# Patient Record
Sex: FEMALE | Race: BLACK OR AFRICAN AMERICAN | Employment: FULL TIME | URBAN - METROPOLITAN AREA
[De-identification: names, ages, dates, MRNs, and addresses within clinical notes are randomized per-mention and may not be internally consistent; named-entity substitution may affect disease eponyms.]

---

## 2018-06-13 LAB
AMB EXT CHLAMYDIA, NUCLEIC ACID AMP: NEGATIVE
AMB EXT GONOCOCCUS, NUCLEIC ACID AMP: NEGATIVE
AMB EXT HBSAG SCREEN: NEGATIVE
AMB EXT HIV AG AB COMBO: NON REACTIVE
AMB EXT TREPONEMAL ANTIBODIES: NON REACTIVE
AMB EXT URINE CULTURE ROUTINE: NO GROWTH

## 2018-06-18 LAB
AMB EXT ANTIBODY SCREEN: NEGATIVE
AMB EXT HEMATOCRIT: 35.3
AMB EXT HEMOGLOBIN: 11.4
AMB EXT MCV: 74.6
AMB EXT PLATELETS: 212
AMB EXT RH FACTOR: POSITIVE

## 2018-06-27 NOTE — Clinical Note
1. IUP @  21w2d  2. Scan consistent with dates 3. No fetal structural abnormalities seen 4. AMA 5. Obesity BMI 46  RECOMMENDATIONS: 1. Weekly NST at 34wks 2.   Monthly Growth US in 3rd trimester No

## 2018-07-11 ENCOUNTER — TELEPHONE (OUTPATIENT)
Dept: OBGYN CLINIC | Facility: CLINIC | Age: 33
End: 2018-07-11

## 2018-07-11 NOTE — TELEPHONE ENCOUNTER
13wks-- LMP: 4/9. Started OB care with [facility] but would like to start care with [facility] instead. Aware of rotating doctors, 2 male and 4 female doctors.

## 2018-07-25 ENCOUNTER — TELEPHONE (OUTPATIENT)
Dept: OBGYN CLINIC | Facility: CLINIC | Age: 33
End: 2018-07-25

## 2018-07-28 NOTE — TELEPHONE ENCOUNTER
Lmtcb to reschedule OBN
Please assist pt with rescheduling OBN appt
Pt no show for her OBN appt, transfer of care.  will call pt to reschedule her appt for OBN.
Clear

## 2018-08-13 ENCOUNTER — NURSE ONLY (OUTPATIENT)
Dept: OBGYN CLINIC | Facility: CLINIC | Age: 33
End: 2018-08-13
Payer: COMMERCIAL

## 2018-08-13 VITALS — WEIGHT: 293 LBS | HEIGHT: 67 IN | BODY MASS INDEX: 45.99 KG/M2

## 2018-08-13 DIAGNOSIS — Z34.82 ENCOUNTER FOR SUPERVISION OF OTHER NORMAL PREGNANCY IN SECOND TRIMESTER: Primary | ICD-10-CM

## 2018-08-13 RX ORDER — CHOLECALCIFEROL (VITAMIN D3) 25 MCG
1 TABLET,CHEWABLE ORAL DAILY
COMMUNITY
End: 2020-09-02

## 2018-08-13 NOTE — PROGRESS NOTES
Pt seen for OBN appt today as a transfer from [facility] with no complaints. Sickle cell ,hep c, and 1 hr gtt added on to pts PN labs. Pt advised all labs must be completed and resulted prior to MD appt. Pt accepted new ob appt with [facility] on 8/20.     Pt has ca fibroid noted on US with this pregnancy. Variocosities/DVTs No    Smoker No    Drug usage in prior year No    Alcohol Yes-socially prior to pregnancy     Would you accept a blood transfusion? If no, are you a Yazdanism?  Yes- pt stated she would

## 2018-08-20 ENCOUNTER — INITIAL PRENATAL (OUTPATIENT)
Dept: OBGYN CLINIC | Facility: CLINIC | Age: 33
End: 2018-08-20
Payer: COMMERCIAL

## 2018-08-20 ENCOUNTER — TELEPHONE (OUTPATIENT)
Dept: OBGYN CLINIC | Facility: CLINIC | Age: 33
End: 2018-08-20

## 2018-08-20 ENCOUNTER — APPOINTMENT (OUTPATIENT)
Dept: LAB | Facility: HOSPITAL | Age: 33
End: 2018-08-20
Attending: OBSTETRICS & GYNECOLOGY
Payer: COMMERCIAL

## 2018-08-20 VITALS
HEART RATE: 83 BPM | SYSTOLIC BLOOD PRESSURE: 134 MMHG | DIASTOLIC BLOOD PRESSURE: 77 MMHG | WEIGHT: 293 LBS | BODY MASS INDEX: 46 KG/M2

## 2018-08-20 DIAGNOSIS — Z34.92 ENCOUNTER FOR SUPERVISION OF NORMAL PREGNANCY IN SECOND TRIMESTER, UNSPECIFIED GRAVIDITY: Primary | ICD-10-CM

## 2018-08-20 DIAGNOSIS — O09.522 ELDERLY MULTIGRAVIDA IN SECOND TRIMESTER: ICD-10-CM

## 2018-08-20 DIAGNOSIS — Z34.82 ENCOUNTER FOR SUPERVISION OF OTHER NORMAL PREGNANCY IN SECOND TRIMESTER: ICD-10-CM

## 2018-08-20 PROBLEM — O09.529 ANTEPARTUM MULTIGRAVIDA OF ADVANCED MATERNAL AGE (HCC): Status: ACTIVE | Noted: 2018-08-20

## 2018-08-20 PROBLEM — O99.210 OBESITY AFFECTING PREGNANCY, ANTEPARTUM (HCC): Status: ACTIVE | Noted: 2018-08-20

## 2018-08-20 PROBLEM — O99.210 OBESITY AFFECTING PREGNANCY, ANTEPARTUM: Status: ACTIVE | Noted: 2018-08-20

## 2018-08-20 PROBLEM — O09.529 ANTEPARTUM MULTIGRAVIDA OF ADVANCED MATERNAL AGE: Status: ACTIVE | Noted: 2018-08-20

## 2018-08-20 LAB
APPEARANCE: CLEAR
GLUCOSE 1H P 50 G GLC PO SERPL-MCNC: 79 MG/DL
MULTISTIX LOT#: NORMAL NUMERIC
PH, URINE: 6 (ref 4.5–8)
SPECIFIC GRAVITY: 1.02 (ref 1–1.03)
URINE-COLOR: YELLOW
UROBILINOGEN,SEMI-QN: 0.2 MG/DL (ref 0–1.9)

## 2018-08-20 PROCEDURE — 82950 GLUCOSE TEST: CPT

## 2018-08-20 PROCEDURE — 86803 HEPATITIS C AB TEST: CPT

## 2018-08-20 PROCEDURE — 85660 RBC SICKLE CELL TEST: CPT

## 2018-08-20 PROCEDURE — 81002 URINALYSIS NONAUTO W/O SCOPE: CPT | Performed by: OBSTETRICS & GYNECOLOGY

## 2018-08-20 PROCEDURE — 36415 COLL VENOUS BLD VENIPUNCTURE: CPT

## 2018-08-20 NOTE — PROGRESS NOTES
No records for review -- only labs / u/s from care everywhere -- transferring from [facility] due to not happy w/ care there. Just did one hour glucola w/ hep C & Sickle cell today. Pt ot call tomorrow for results.  Reveiwed rationale for maternal echo & sleep st

## 2018-08-21 LAB — HCV AB SERPL QL IA: NONREACTIVE

## 2018-08-22 ENCOUNTER — TELEPHONE (OUTPATIENT)
Dept: OBGYN CLINIC | Facility: CLINIC | Age: 33
End: 2018-08-22

## 2018-08-22 NOTE — TELEPHONE ENCOUNTER
PT NOTIFIED ORDER FOR CONSULT AND LEVEL II PLACED AND PHONE NUMBER GIVEN TO SCHEDULE. PT IS AWARE LABS WERE NORMAL BUT STILL WAITING FOR HEMOGLOBIN SOLUBILITY WHICH IS IN PROCESS AND CAN CALL [facility] FOR THE RESULT.    PT AWARE SHE NEEDS TO CALL CENTRAL SCHEDU

## 2018-08-22 NOTE — TELEPHONE ENCOUNTER
Lmtcb. Please relay info:    Order for level 2 and mfm consult routed. Patient can call [phone] to schedule an appointment. Patient's insurance did not approve her maternal echo or sleep study.

## 2018-08-22 NOTE — TELEPHONE ENCOUNTER
ORDER PLACED FOR MATERNAL ECHO. LMTCB FOR SLEEP CENTER TO FIND OUT HOW TO CORRECTLY ORDER HOME SLEEP STUDY.

## 2018-08-22 NOTE — TELEPHONE ENCOUNTER
Per [facility] clinical reviewer patient does not meet criteria for cpt 40077/ maternal echo. For Peer to Peer review MD can call [phone].  No Ref#  Clinical reviewer Vishnu Dukes. noted pt does not meet criteria for cpt 61 [phone] 57, but, does qualify for home sleep

## 2018-08-23 NOTE — TELEPHONE ENCOUNTER
RECEIVED DENIAL FOR SLEEP STUDY IN THE SLEEP CENTER. CALLED SLEEP CENTER AGAIN.  WAS TOLD THE OUTPATIENT HOME SLEEP STUDY IS CPT: 22174.

## 2018-08-27 NOTE — TELEPHONE ENCOUNTER
MATERNAL ECHO ORDERED BELOW WAS INCORRECT AGAIN, ORDER CANCELLED AND EKG ORDERED. CALLED [facility], SPOKE WITH ANIBAL. CLINICAL INFO GIVEN FOR HOME SLEEP STUDY, CPT: [id number]. WAS TOLD THIS IS GOING TO MD REVIEW AND A PEER TO PEER IS NEEDED.   NEED TO MIKA

## 2018-08-27 NOTE — TELEPHONE ENCOUNTER
LMTCB.   NEED TO TELL PT ABOUT EKG AND THAT WE ARE WAITING TO FIND OUT IF WE WILL GET APPROVAL FOR HOME SLEEP STUDY.

## 2018-08-28 NOTE — TELEPHONE ENCOUNTER
Received notification via fax of coverage denial for home sleep study. Reference/Service #[id number]. Routed to [address] to please call for peer to peer.  See message below on 8/27/18 at 8:22 am.

## 2018-08-30 LAB — HGB S BLD QL SOLY: NEGATIVE

## 2018-09-05 ENCOUNTER — HOSPITAL ENCOUNTER (OUTPATIENT)
Dept: PERINATAL CARE | Facility: HOSPITAL | Age: 33
Discharge: HOME | End: 2018-09-05
Attending: OBSTETRICS & GYNECOLOGY
Payer: COMMERCIAL

## 2018-09-05 VITALS — DIASTOLIC BLOOD PRESSURE: 75 MMHG | SYSTOLIC BLOOD PRESSURE: 139 MMHG

## 2018-09-05 DIAGNOSIS — O09.529 ANTEPARTUM MULTIGRAVIDA OF ADVANCED MATERNAL AGE: Primary | ICD-10-CM

## 2018-09-05 DIAGNOSIS — O09.529 ANTEPARTUM MULTIGRAVIDA OF ADVANCED MATERNAL AGE: ICD-10-CM

## 2018-09-05 DIAGNOSIS — O99.210 OBESITY AFFECTING PREGNANCY, ANTEPARTUM: ICD-10-CM

## 2018-09-05 PROCEDURE — 76811 OB US DETAILED SNGL FETUS: CPT | Performed by: OBSTETRICS & GYNECOLOGY

## 2018-09-05 PROCEDURE — 99243 OFF/OP CNSLTJ NEW/EST LOW 30: CPT | Performed by: OBSTETRICS & GYNECOLOGY

## 2018-09-05 NOTE — PROGRESS NOTES
Pt for level 2 US  HX ama morbid obesity hx pt del x2 1 with oligo  Denies pregnancy complaints  States active fetus

## 2018-09-05 NOTE — PROGRESS NOTES
Reason for Consult:   Dear Dr. Josep Townsend,    Thank you for requesting ultrasound evaluation and maternal fetal medicine consultation on Berdie Signs. As you are aware she is a 26year old female with a Gerber pregnancy at 22w2d.   A maternal-fetal med Comment: Right breast, 2016   Family History   Problem Relation Age of Onset   • Cancer Maternal Grandmother      lung    • Diabetes Maternal Grandfather    • Cancer Paternal Grandmother      bladder    • Diabetes Paternal Grandmother       Smoking statu age-related risk, or (2) offer maternal serum testing/nuchal translucency evaluation to adjust the age related risk and potentially decrease the number of invasive tests.             Cardiac malformations, clubfoot, and diaphragmatic hernia appear to occur gestation would drop the risk of fetal death from 5.2 to 1.3 per 1000 pregnancies.  While a policy of antepartum testing in older women does increase the chance that a women will be induced (71 inductions per fetal death averted) and thereby increases her r two most common medical complications of the obese . The increased risk of type 2 diabetes is primarily related to an exaggerated increase in insulin resistance in the obese state.  It is reasonable to screen obese gravidas for undiagnosed pregestati that overweight and obese pregnant women experienced significantly more stillbirths than normal weight women. Increase  testing and Level 2 Ultrasound is recommended.            OB ULTRASOUND REPORT   See imaging tab for complete ultrasound re minutes in evaluation, consultation, and coordination of care. Greater than 50% of this time was spent in face to face discussion with the patient.

## 2018-09-17 ENCOUNTER — ROUTINE PRENATAL (OUTPATIENT)
Dept: OBGYN CLINIC | Facility: CLINIC | Age: 33
End: 2018-09-17
Payer: COMMERCIAL

## 2018-09-17 ENCOUNTER — TELEPHONE (OUTPATIENT)
Dept: OBGYN CLINIC | Facility: CLINIC | Age: 33
End: 2018-09-17

## 2018-09-17 VITALS
BODY MASS INDEX: 47 KG/M2 | HEART RATE: 81 BPM | WEIGHT: 293 LBS | DIASTOLIC BLOOD PRESSURE: 77 MMHG | SYSTOLIC BLOOD PRESSURE: 110 MMHG

## 2018-09-17 DIAGNOSIS — Z34.92 ENCOUNTER FOR SUPERVISION OF NORMAL PREGNANCY IN SECOND TRIMESTER, UNSPECIFIED GRAVIDITY: Primary | ICD-10-CM

## 2018-09-17 PROBLEM — Z87.51 HISTORY OF PRETERM DELIVERY: Status: ACTIVE | Noted: 2018-09-17

## 2018-09-17 LAB
MULTISTIX LOT#: NORMAL NUMERIC
PH, URINE: 6.5 (ref 4.5–8)
SPECIFIC GRAVITY: 1.02 (ref 1–1.03)
UROBILINOGEN,SEMI-QN: 0.2 MG/DL (ref 0–1.9)

## 2018-09-17 PROCEDURE — 81002 URINALYSIS NONAUTO W/O SCOPE: CPT | Performed by: OBSTETRICS & GYNECOLOGY

## 2018-09-17 NOTE — TELEPHONE ENCOUNTER
I saw pt today and forgot to ask about whether sleep study and maternal echo have been scheduled for BMI>45. Please inquire, thanks.

## 2018-09-17 NOTE — TELEPHONE ENCOUNTER
Pt states the sleep study is not covered under her insurance and \"I am not scheduling that until I know that my insurance will cover this test and the same goes for that other test.\" Stressed importance of these tests to the pt and pt states she will look into again with my insurance \"but I will not be scheduling those tests until I know for sure they are covered. \" Pt aware message will be sent to [facility] as RUTH ANN.

## 2018-10-15 ENCOUNTER — PATIENT MESSAGE (OUTPATIENT)
Dept: OBGYN CLINIC | Facility: CLINIC | Age: 33
End: 2018-10-15

## 2018-10-15 ENCOUNTER — TELEPHONE (OUTPATIENT)
Dept: OBGYN CLINIC | Facility: CLINIC | Age: 33
End: 2018-10-15

## 2018-10-15 ENCOUNTER — ROUTINE PRENATAL (OUTPATIENT)
Dept: OBGYN CLINIC | Facility: CLINIC | Age: 33
End: 2018-10-15
Payer: COMMERCIAL

## 2018-10-15 VITALS
WEIGHT: 293 LBS | SYSTOLIC BLOOD PRESSURE: 126 MMHG | BODY MASS INDEX: 47 KG/M2 | HEART RATE: 83 BPM | DIASTOLIC BLOOD PRESSURE: 82 MMHG

## 2018-10-15 DIAGNOSIS — Z34.92 ENCOUNTER FOR SUPERVISION OF NORMAL PREGNANCY IN SECOND TRIMESTER, UNSPECIFIED GRAVIDITY: Primary | ICD-10-CM

## 2018-10-15 PROCEDURE — 81002 URINALYSIS NONAUTO W/O SCOPE: CPT | Performed by: OBSTETRICS & GYNECOLOGY

## 2018-10-15 NOTE — TELEPHONE ENCOUNTER
LEFT MESSAGE FOR MARISOL THAT ORDER FOR MATERNAL ECHO HAS BEEN PLACED. ORDER FOR GROWTH ULTRASOUNDS PLACED ALSO.    PT NOTIFIED AND PHONE NUMBER GIVEN FOR [facility] TO SCHEDULE GROWTH US'S AND SHE IS AWARE CENTRAL SCHEDULING WILL BE CALLING HER BACK TO SCHEDULE

## 2018-10-15 NOTE — TELEPHONE ENCOUNTER
From: Juani Bermeo  To: Luanne Cheung DO  Sent: 10/15/2018 2:44 PM CDT  Subject: Other    Damien Hinds,  Reaching out about current order for the Eco heart testing.  Called central scheduling and the  stated no order was in place for me

## 2018-10-15 NOTE — PROGRESS NOTES
NO issues. Refusing to do sleep study as it isn't covered by insurance. States she tried to schedule echo but was having difficulty scheduling. Encouraged importance. Declines flu today but will consider. Needs monthly growth for BMI.   Still needs to

## 2018-10-15 NOTE — TELEPHONE ENCOUNTER
Patient needs monthly growth in 3rd trimester with MFM for BMI. Patient still needs echocardiogram for BMI as well.

## 2018-10-15 NOTE — TELEPHONE ENCOUNTER
Alka Grey from [place] [id number] central scheduling calling patient trying to schedule echo, no order please advice.

## 2018-10-18 ENCOUNTER — TELEPHONE (OUTPATIENT)
Dept: OBGYN CLINIC | Facility: CLINIC | Age: 33
End: 2018-10-18

## 2018-10-20 NOTE — PROGRESS NOTES
En Stephenson    Dear Dr. Katherine Varela    Thank you for requesting ultrasound evaluation and maternal fetal medicine consultation on your patient Shanta Hicks.   As you are aware she is a 26year old female [id number] with a sing

## 2018-10-22 ENCOUNTER — HOSPITAL ENCOUNTER (OUTPATIENT)
Dept: PERINATAL CARE | Facility: HOSPITAL | Age: 33
Discharge: HOME | End: 2018-10-22
Attending: OBSTETRICS & GYNECOLOGY
Payer: COMMERCIAL

## 2018-10-22 VITALS
DIASTOLIC BLOOD PRESSURE: 69 MMHG | BODY MASS INDEX: 45.99 KG/M2 | HEART RATE: 92 BPM | HEIGHT: 67 IN | SYSTOLIC BLOOD PRESSURE: 132 MMHG | WEIGHT: 293 LBS

## 2018-10-22 DIAGNOSIS — O99.210 OBESITY AFFECTING PREGNANCY, ANTEPARTUM: ICD-10-CM

## 2018-10-22 DIAGNOSIS — O09.529 ANTEPARTUM MULTIGRAVIDA OF ADVANCED MATERNAL AGE: ICD-10-CM

## 2018-10-22 PROCEDURE — 99213 OFFICE O/P EST LOW 20 MIN: CPT | Performed by: OBSTETRICS & GYNECOLOGY

## 2018-10-22 PROCEDURE — 76816 OB US FOLLOW-UP PER FETUS: CPT | Performed by: OBSTETRICS & GYNECOLOGY

## 2018-10-29 ENCOUNTER — TELEPHONE (OUTPATIENT)
Dept: OBGYN CLINIC | Facility: CLINIC | Age: 33
End: 2018-10-29

## 2018-10-29 NOTE — TELEPHONE ENCOUNTER
Overdue results. 29w Pt states depends on  for ride and will complete labs this week. [facility] lab hours given - states prefers this location. Also assisted pt in scheduling PN appt. Requesting appt at 1pm this week or next Monday.  Pt accepted PN appt wi

## 2018-11-04 ENCOUNTER — APPOINTMENT (OUTPATIENT)
Dept: LAB | Facility: HOSPITAL | Age: 33
End: 2018-11-04
Attending: OBSTETRICS & GYNECOLOGY
Payer: COMMERCIAL

## 2018-11-04 DIAGNOSIS — Z34.92 ENCOUNTER FOR SUPERVISION OF NORMAL PREGNANCY IN SECOND TRIMESTER, UNSPECIFIED GRAVIDITY: ICD-10-CM

## 2018-11-04 PROCEDURE — 85027 COMPLETE CBC AUTOMATED: CPT

## 2018-11-04 PROCEDURE — 82950 GLUCOSE TEST: CPT

## 2018-11-04 PROCEDURE — 36415 COLL VENOUS BLD VENIPUNCTURE: CPT

## 2018-11-05 ENCOUNTER — ROUTINE PRENATAL (OUTPATIENT)
Dept: OBGYN CLINIC | Facility: CLINIC | Age: 33
End: 2018-11-05
Payer: COMMERCIAL

## 2018-11-05 VITALS
SYSTOLIC BLOOD PRESSURE: 126 MMHG | HEART RATE: 108 BPM | BODY MASS INDEX: 47 KG/M2 | DIASTOLIC BLOOD PRESSURE: 80 MMHG | WEIGHT: 293 LBS

## 2018-11-05 DIAGNOSIS — Z34.93 ENCOUNTER FOR SUPERVISION OF NORMAL PREGNANCY IN THIRD TRIMESTER, UNSPECIFIED GRAVIDITY: Primary | ICD-10-CM

## 2018-11-05 PROCEDURE — 81002 URINALYSIS NONAUTO W/O SCOPE: CPT | Performed by: OBSTETRICS & GYNECOLOGY

## 2018-11-05 NOTE — PROGRESS NOTES
No issues. Insurance denying sleep study  And maternal echo. Growth 55%. Has repeat in 4 weeks. Anemic so slow FE daily michael'd. GTT wnl. Declines vaccines. RTC 2 wks.

## 2018-11-16 ENCOUNTER — TELEPHONE (OUTPATIENT)
Dept: OBGYN CLINIC | Facility: CLINIC | Age: 33
End: 2018-11-16

## 2018-11-16 NOTE — TELEPHONE ENCOUNTER
Pt states the pressure in the lower abdomen that comes and goes has happened 2 times over the past 2 hours. The pressure lasts 1-2 min. Pt states baby is active and she has not had any blood, mucus or watery d/c from the vagina.   Per KEYONA, pt was advised

## 2018-11-17 NOTE — PROGRESS NOTES
Zachery Holloway  Dear Dr. Amanda Gee     Thank you for requesting ultrasound evaluation and maternal fetal medicine consultation on your patient Amee Ramos.   As you are aware she is a 26year old female [id number] with a si

## 2018-11-19 ENCOUNTER — ROUTINE PRENATAL (OUTPATIENT)
Dept: OBGYN CLINIC | Facility: CLINIC | Age: 33
End: 2018-11-19
Payer: COMMERCIAL

## 2018-11-19 ENCOUNTER — HOSPITAL ENCOUNTER (OUTPATIENT)
Dept: PERINATAL CARE | Facility: HOSPITAL | Age: 33
Discharge: HOME | End: 2018-11-19
Attending: OBSTETRICS & GYNECOLOGY
Payer: COMMERCIAL

## 2018-11-19 VITALS
DIASTOLIC BLOOD PRESSURE: 62 MMHG | SYSTOLIC BLOOD PRESSURE: 94 MMHG | HEART RATE: 97 BPM | WEIGHT: 293 LBS | BODY MASS INDEX: 47 KG/M2

## 2018-11-19 VITALS
HEART RATE: 108 BPM | WEIGHT: 293 LBS | SYSTOLIC BLOOD PRESSURE: 126 MMHG | DIASTOLIC BLOOD PRESSURE: 77 MMHG | HEIGHT: 67 IN | BODY MASS INDEX: 45.99 KG/M2

## 2018-11-19 DIAGNOSIS — O99.210 OBESITY AFFECTING PREGNANCY, ANTEPARTUM: ICD-10-CM

## 2018-11-19 DIAGNOSIS — Z87.51 HISTORY OF PRETERM DELIVERY: ICD-10-CM

## 2018-11-19 DIAGNOSIS — O09.529 ANTEPARTUM MULTIGRAVIDA OF ADVANCED MATERNAL AGE: Primary | ICD-10-CM

## 2018-11-19 DIAGNOSIS — O09.529 ANTEPARTUM MULTIGRAVIDA OF ADVANCED MATERNAL AGE: ICD-10-CM

## 2018-11-19 DIAGNOSIS — Z34.93 ENCOUNTER FOR SUPERVISION OF NORMAL PREGNANCY IN THIRD TRIMESTER, UNSPECIFIED GRAVIDITY: Primary | ICD-10-CM

## 2018-11-19 DIAGNOSIS — O09.523 MULTIGRAVIDA OF ADVANCED MATERNAL AGE IN THIRD TRIMESTER: ICD-10-CM

## 2018-11-19 PROCEDURE — 99213 OFFICE O/P EST LOW 20 MIN: CPT | Performed by: OBSTETRICS & GYNECOLOGY

## 2018-11-19 PROCEDURE — 81002 URINALYSIS NONAUTO W/O SCOPE: CPT | Performed by: OBSTETRICS & GYNECOLOGY

## 2018-11-19 PROCEDURE — 76819 FETAL BIOPHYS PROFIL W/O NST: CPT | Performed by: OBSTETRICS & GYNECOLOGY

## 2018-11-19 PROCEDURE — 76816 OB US FOLLOW-UP PER FETUS: CPT | Performed by: OBSTETRICS & GYNECOLOGY

## 2018-11-19 RX ORDER — MELATONIN
325
COMMUNITY
End: 2019-02-22

## 2018-11-19 NOTE — ADDENDUM NOTE
Encounter addended by: Bonita Plaza on: 11/19/2018 1:38 PM   Actions taken: Charge Capture section accepted

## 2018-12-05 ENCOUNTER — ROUTINE PRENATAL (OUTPATIENT)
Dept: OBGYN CLINIC | Facility: CLINIC | Age: 33
End: 2018-12-05
Payer: COMMERCIAL

## 2018-12-05 VITALS
BODY MASS INDEX: 47 KG/M2 | DIASTOLIC BLOOD PRESSURE: 81 MMHG | SYSTOLIC BLOOD PRESSURE: 135 MMHG | HEART RATE: 81 BPM | WEIGHT: 293 LBS

## 2018-12-05 DIAGNOSIS — Z34.93 ENCOUNTER FOR SUPERVISION OF NORMAL PREGNANCY IN THIRD TRIMESTER, UNSPECIFIED GRAVIDITY: Primary | ICD-10-CM

## 2018-12-05 PROCEDURE — 81002 URINALYSIS NONAUTO W/O SCOPE: CPT | Performed by: OBSTETRICS & GYNECOLOGY

## 2018-12-13 ENCOUNTER — HOSPITAL ENCOUNTER (OUTPATIENT)
Dept: PERINATAL CARE | Facility: HOSPITAL | Age: 33
Discharge: HOME | End: 2018-12-13
Attending: OBSTETRICS & GYNECOLOGY | Admitting: OBSTETRICS & GYNECOLOGY
Payer: COMMERCIAL

## 2018-12-13 VITALS — HEART RATE: 93 BPM | SYSTOLIC BLOOD PRESSURE: 110 MMHG | DIASTOLIC BLOOD PRESSURE: 57 MMHG

## 2018-12-13 DIAGNOSIS — O99.210 OBESITY AFFECTING PREGNANCY, ANTEPARTUM: ICD-10-CM

## 2018-12-13 DIAGNOSIS — O09.523 MULTIGRAVIDA OF ADVANCED MATERNAL AGE IN THIRD TRIMESTER: ICD-10-CM

## 2018-12-13 PROCEDURE — 59025 FETAL NON-STRESS TEST: CPT | Performed by: OBSTETRICS & GYNECOLOGY

## 2018-12-14 NOTE — PROGRESS NOTES
Outpatient Maternal-Fetal Medicine Follow-Up     Dear Chloe Viramontes     Thank you for requesting ultrasound evaluation and maternal fetal medicine consultation on your patient Sly Minaya you are aware she is a 26year old female  with a si

## 2018-12-17 ENCOUNTER — HOSPITAL ENCOUNTER (OUTPATIENT)
Dept: PERINATAL CARE | Facility: HOSPITAL | Age: 33
Discharge: HOME | End: 2018-12-17
Attending: OBSTETRICS & GYNECOLOGY
Payer: COMMERCIAL

## 2018-12-17 VITALS
SYSTOLIC BLOOD PRESSURE: 111 MMHG | BODY MASS INDEX: 45.99 KG/M2 | WEIGHT: 293 LBS | DIASTOLIC BLOOD PRESSURE: 75 MMHG | HEIGHT: 67 IN | HEART RATE: 87 BPM

## 2018-12-17 DIAGNOSIS — O09.529 ANTEPARTUM MULTIGRAVIDA OF ADVANCED MATERNAL AGE: Primary | ICD-10-CM

## 2018-12-17 DIAGNOSIS — Z87.51 HISTORY OF PRETERM DELIVERY: ICD-10-CM

## 2018-12-17 DIAGNOSIS — O09.529 ANTEPARTUM MULTIGRAVIDA OF ADVANCED MATERNAL AGE: ICD-10-CM

## 2018-12-17 DIAGNOSIS — O99.210 OBESITY AFFECTING PREGNANCY, ANTEPARTUM: ICD-10-CM

## 2018-12-17 PROCEDURE — 76816 OB US FOLLOW-UP PER FETUS: CPT | Performed by: OBSTETRICS & GYNECOLOGY

## 2018-12-17 PROCEDURE — 99213 OFFICE O/P EST LOW 20 MIN: CPT | Performed by: OBSTETRICS & GYNECOLOGY

## 2018-12-17 PROCEDURE — 76819 FETAL BIOPHYS PROFIL W/O NST: CPT | Performed by: OBSTETRICS & GYNECOLOGY

## 2018-12-17 NOTE — ADDENDUM NOTE
Encounter addended by: Kristin Brink on: 12/17/2018 12:56 PM   Actions taken: Charge Capture section accepted

## 2018-12-18 ENCOUNTER — ROUTINE PRENATAL (OUTPATIENT)
Dept: OBGYN CLINIC | Facility: CLINIC | Age: 33
End: 2018-12-18
Payer: COMMERCIAL

## 2018-12-18 VITALS
HEART RATE: 65 BPM | BODY MASS INDEX: 47 KG/M2 | DIASTOLIC BLOOD PRESSURE: 80 MMHG | WEIGHT: 293 LBS | SYSTOLIC BLOOD PRESSURE: 118 MMHG

## 2018-12-18 DIAGNOSIS — Z34.93 ENCOUNTER FOR SUPERVISION OF NORMAL PREGNANCY IN THIRD TRIMESTER, UNSPECIFIED GRAVIDITY: Primary | ICD-10-CM

## 2018-12-18 PROCEDURE — 81002 URINALYSIS NONAUTO W/O SCOPE: CPT | Performed by: OBSTETRICS & GYNECOLOGY

## 2018-12-19 NOTE — PROGRESS NOTES
GBS swab done today. NST schedule for Thursday. Reviewed kick counts and labor precautions.    RTC 1 wk

## 2018-12-20 ENCOUNTER — TELEPHONE (OUTPATIENT)
Dept: PERINATAL CARE | Facility: HOSPITAL | Age: 33
End: 2018-12-20

## 2018-12-20 NOTE — TELEPHONE ENCOUNTER
PATIENT CALLED TO CANCEL NST APPT. OFFERED LATER APPT TIME SAME DAY PATIENT REFUSED. APPT OFFERED 12/21/18 PT UNABLE TO COME.   PATIENT STATES WILL NOT BE ABLE TO RESCHEDULE TILL 12/27/18, APPT MADE

## 2018-12-27 ENCOUNTER — ROUTINE PRENATAL (OUTPATIENT)
Dept: OBGYN CLINIC | Facility: CLINIC | Age: 33
End: 2018-12-27
Payer: COMMERCIAL

## 2018-12-27 ENCOUNTER — APPOINTMENT (OUTPATIENT)
Dept: LAB | Facility: HOSPITAL | Age: 33
End: 2018-12-27
Attending: OBSTETRICS & GYNECOLOGY
Payer: COMMERCIAL

## 2018-12-27 ENCOUNTER — HOSPITAL ENCOUNTER (OUTPATIENT)
Dept: PERINATAL CARE | Facility: HOSPITAL | Age: 33
Discharge: HOME | End: 2018-12-27
Attending: OBSTETRICS & GYNECOLOGY
Payer: COMMERCIAL

## 2018-12-27 VITALS
DIASTOLIC BLOOD PRESSURE: 75 MMHG | SYSTOLIC BLOOD PRESSURE: 136 MMHG | HEART RATE: 71 BPM | BODY MASS INDEX: 48 KG/M2 | WEIGHT: 293 LBS

## 2018-12-27 VITALS — DIASTOLIC BLOOD PRESSURE: 65 MMHG | SYSTOLIC BLOOD PRESSURE: 123 MMHG

## 2018-12-27 DIAGNOSIS — E66.9 OBESITY: ICD-10-CM

## 2018-12-27 DIAGNOSIS — Z34.93 ENCOUNTER FOR SUPERVISION OF NORMAL PREGNANCY IN THIRD TRIMESTER, UNSPECIFIED GRAVIDITY: Primary | ICD-10-CM

## 2018-12-27 DIAGNOSIS — Z34.93 ENCOUNTER FOR SUPERVISION OF NORMAL PREGNANCY IN THIRD TRIMESTER, UNSPECIFIED GRAVIDITY: ICD-10-CM

## 2018-12-27 DIAGNOSIS — O09.523 MULTIGRAVIDA OF ADVANCED MATERNAL AGE IN THIRD TRIMESTER: ICD-10-CM

## 2018-12-27 DIAGNOSIS — O09.529 AMA (ADVANCED MATERNAL AGE) MULTIGRAVIDA 35+: Primary | ICD-10-CM

## 2018-12-27 LAB
ERYTHROCYTE [DISTWIDTH] IN BLOOD BY AUTOMATED COUNT: 17.3 % (ref 11–15)
HCT VFR BLD AUTO: 32.5 % (ref 35–48)
HGB BLD-MCNC: 10.3 G/DL (ref 12–16)
MCH RBC QN AUTO: 23.6 PG (ref 27–32)
MCHC RBC AUTO-ENTMCNC: 31.8 G/DL (ref 32–37)
MCV RBC AUTO: 74.1 FL (ref 80–100)
PLATELET # BLD AUTO: 162 K/UL (ref 140–400)
PMV BLD AUTO: 9.1 FL (ref 7.4–10.3)
RBC # BLD AUTO: 4.39 M/UL (ref 3.7–5.4)
WBC # BLD AUTO: 8.2 K/UL (ref 4–11)

## 2018-12-27 PROCEDURE — 81002 URINALYSIS NONAUTO W/O SCOPE: CPT | Performed by: OBSTETRICS & GYNECOLOGY

## 2018-12-27 PROCEDURE — 86780 TREPONEMA PALLIDUM: CPT

## 2018-12-27 PROCEDURE — 87389 HIV-1 AG W/HIV-1&-2 AB AG IA: CPT

## 2018-12-27 PROCEDURE — 59025 FETAL NON-STRESS TEST: CPT | Performed by: OBSTETRICS & GYNECOLOGY

## 2018-12-27 PROCEDURE — 85027 COMPLETE CBC AUTOMATED: CPT

## 2018-12-27 PROCEDURE — 36415 COLL VENOUS BLD VENIPUNCTURE: CPT

## 2018-12-27 NOTE — PROGRESS NOTES
NO issues. Denies PreE symptoms. First BP elevated and repeat normal.  Has NST at 6pm tonight. RTC 1 wk.

## 2018-12-28 LAB
HIV1+2 AB SERPL QL IA: NONREACTIVE
T PALLIDUM AB SER QL: NEGATIVE

## 2018-12-28 NOTE — NST
Nonstress Test   Patient: Emely Doom    Gestation: 37w3d    NST:       Variability: Moderate           Accelerations: Yes           Decelerations: None            Baseline: 135 BPM           Uterine Irritability: No           Contractions: Not present

## 2018-12-31 ENCOUNTER — ROUTINE PRENATAL (OUTPATIENT)
Dept: OBGYN CLINIC | Facility: CLINIC | Age: 33
End: 2018-12-31
Payer: COMMERCIAL

## 2018-12-31 VITALS
HEART RATE: 82 BPM | SYSTOLIC BLOOD PRESSURE: 122 MMHG | DIASTOLIC BLOOD PRESSURE: 79 MMHG | BODY MASS INDEX: 48 KG/M2 | WEIGHT: 293 LBS

## 2018-12-31 DIAGNOSIS — Z34.93 ENCOUNTER FOR SUPERVISION OF NORMAL PREGNANCY IN THIRD TRIMESTER, UNSPECIFIED GRAVIDITY: Primary | ICD-10-CM

## 2018-12-31 PROCEDURE — 81002 URINALYSIS NONAUTO W/O SCOPE: CPT | Performed by: OBSTETRICS & GYNECOLOGY

## 2019-01-01 ENCOUNTER — TELEPHONE (OUTPATIENT)
Dept: OBGYN CLINIC | Facility: CLINIC | Age: 34
End: 2019-01-01

## 2019-01-04 ENCOUNTER — APPOINTMENT (OUTPATIENT)
Dept: OBGYN CLINIC | Facility: HOSPITAL | Age: 34
End: 2019-01-04
Payer: COMMERCIAL

## 2019-01-04 ENCOUNTER — HOSPITAL ENCOUNTER (OUTPATIENT)
Facility: HOSPITAL | Age: 34
Discharge: HOME | End: 2019-01-04
Attending: OBSTETRICS & GYNECOLOGY | Admitting: OBSTETRICS & GYNECOLOGY
Payer: COMMERCIAL

## 2019-01-04 VITALS — HEART RATE: 91 BPM | DIASTOLIC BLOOD PRESSURE: 63 MMHG | SYSTOLIC BLOOD PRESSURE: 105 MMHG

## 2019-01-04 PROBLEM — Z36.89 NON-STRESS TEST REACTIVE: Status: ACTIVE | Noted: 2019-01-04

## 2019-01-04 PROBLEM — Z36.89: Status: ACTIVE | Noted: 2019-01-04

## 2019-01-04 PROCEDURE — 59025 FETAL NON-STRESS TEST: CPT | Performed by: OBSTETRICS & GYNECOLOGY

## 2019-01-04 NOTE — ADDENDUM NOTE
Encounter addended by: John Bullard MD on: 1/3/2019 7:27 PM   Actions taken: Sign clinical note, Visit diagnoses modified, Charge Capture section accepted

## 2019-01-05 NOTE — NST
Nonstress Test   Patient: Hoang Shirley    Gestation: 38w4d    NST:       Variability: Moderate           Accelerations: Yes           Decelerations: None            Baseline: 135 BPM           Uterine Irritability: No           Contractions: Irregular

## 2019-01-05 NOTE — PROGRESS NOTES
Pt is a 26year old female admitted to TR4/TR4-A. Patient presents with:  Non-stress Test: AMA and high BMI     Pt is [id number] 38w4d intra-uterine pregnancy. History obtained, consents signed. Oriented to room, staff, and plan of care.

## 2019-01-07 ENCOUNTER — ROUTINE PRENATAL (OUTPATIENT)
Dept: OBGYN CLINIC | Facility: CLINIC | Age: 34
End: 2019-01-07
Payer: COMMERCIAL

## 2019-01-07 VITALS
DIASTOLIC BLOOD PRESSURE: 76 MMHG | HEART RATE: 82 BPM | SYSTOLIC BLOOD PRESSURE: 138 MMHG | WEIGHT: 293 LBS | BODY MASS INDEX: 48 KG/M2

## 2019-01-07 DIAGNOSIS — Z34.93 ENCOUNTER FOR SUPERVISION OF NORMAL PREGNANCY IN THIRD TRIMESTER, UNSPECIFIED GRAVIDITY: Primary | ICD-10-CM

## 2019-01-07 LAB
MULTISTIX LOT#: NORMAL NUMERIC
PH, URINE: 7 (ref 4.5–8)
SPECIFIC GRAVITY: 1.03 (ref 1–1.03)

## 2019-01-07 PROCEDURE — 81002 URINALYSIS NONAUTO W/O SCOPE: CPT | Performed by: OBSTETRICS & GYNECOLOGY

## 2019-01-11 ENCOUNTER — HOSPITAL ENCOUNTER (INPATIENT)
Facility: HOSPITAL | Age: 34
LOS: 1 days | Discharge: HOME | End: 2019-01-12
Attending: OBSTETRICS & GYNECOLOGY | Admitting: OBSTETRICS & GYNECOLOGY
Payer: COMMERCIAL

## 2019-01-11 PROBLEM — Z37.9 NORMAL LABOR: Status: ACTIVE | Noted: 2019-01-11

## 2019-01-11 PROBLEM — Z34.90 PREGNANT (HCC): Status: ACTIVE | Noted: 2019-01-11

## 2019-01-11 PROBLEM — Z37.9 NORMAL LABOR (HCC): Status: ACTIVE | Noted: 2019-01-11

## 2019-01-11 PROBLEM — Z34.90 PREGNANT: Status: ACTIVE | Noted: 2019-01-11

## 2019-01-11 LAB
ANTIBODY SCREEN: NEGATIVE
ERYTHROCYTE [DISTWIDTH] IN BLOOD BY AUTOMATED COUNT: 18.3 % (ref 11–15)
HCT VFR BLD AUTO: 35.1 % (ref 35–48)
HGB BLD-MCNC: 11.4 G/DL (ref 12–16)
MCH RBC QN AUTO: 24.1 PG (ref 27–32)
MCHC RBC AUTO-ENTMCNC: 32.6 G/DL (ref 32–37)
MCV RBC AUTO: 74 FL (ref 80–100)
PLATELET # BLD AUTO: 154 K/UL (ref 140–400)
PMV BLD AUTO: 9.5 FL (ref 7.4–10.3)
RBC # BLD AUTO: 4.75 M/UL (ref 3.7–5.4)
RH BLOOD TYPE: POSITIVE
WBC # BLD AUTO: 11.2 K/UL (ref 4–11)

## 2019-01-11 PROCEDURE — 86850 RBC ANTIBODY SCREEN: CPT | Performed by: OBSTETRICS & GYNECOLOGY

## 2019-01-11 PROCEDURE — 86901 BLOOD TYPING SEROLOGIC RH(D): CPT | Performed by: OBSTETRICS & GYNECOLOGY

## 2019-01-11 PROCEDURE — 85027 COMPLETE CBC AUTOMATED: CPT | Performed by: OBSTETRICS & GYNECOLOGY

## 2019-01-11 PROCEDURE — 86900 BLOOD TYPING SEROLOGIC ABO: CPT | Performed by: OBSTETRICS & GYNECOLOGY

## 2019-01-11 PROCEDURE — 0UQMXZZ REPAIR VULVA, EXTERNAL APPROACH: ICD-10-PCS | Performed by: OBSTETRICS & GYNECOLOGY

## 2019-01-11 PROCEDURE — 99214 OFFICE O/P EST MOD 30 MIN: CPT

## 2019-01-11 PROCEDURE — 0HQ9XZZ REPAIR PERINEUM SKIN, EXTERNAL APPROACH: ICD-10-PCS | Performed by: OBSTETRICS & GYNECOLOGY

## 2019-01-11 RX ORDER — EPHEDRINE SULFATE/0.9% NACL/PF 25 MG/5 ML
SYRINGE (ML) INTRAVENOUS
Status: DISCONTINUED
Start: 2019-01-11 | End: 2019-01-11 | Stop reason: WASHOUT

## 2019-01-11 RX ORDER — TERBUTALINE SULFATE 1 MG/ML
0.25 INJECTION, SOLUTION SUBCUTANEOUS AS NEEDED
Status: DISCONTINUED | OUTPATIENT
Start: 2019-01-11 | End: 2019-01-11 | Stop reason: HOSPADM

## 2019-01-11 RX ORDER — DEXTROSE, SODIUM CHLORIDE, SODIUM LACTATE, POTASSIUM CHLORIDE, AND CALCIUM CHLORIDE 5; .6; .31; .03; .02 G/100ML; G/100ML; G/100ML; G/100ML; G/100ML
INJECTION, SOLUTION INTRAVENOUS CONTINUOUS
Status: DISCONTINUED | OUTPATIENT
Start: 2019-01-11 | End: 2019-01-11 | Stop reason: HOSPADM

## 2019-01-11 RX ORDER — IBUPROFEN 600 MG/1
600 TABLET ORAL EVERY 6 HOURS
Status: DISCONTINUED | OUTPATIENT
Start: 2019-01-11 | End: 2019-01-12

## 2019-01-11 RX ORDER — NALBUPHINE HCL 10 MG/ML
2.5 AMPUL (ML) INJECTION
Status: DISCONTINUED | OUTPATIENT
Start: 2019-01-11 | End: 2019-01-12

## 2019-01-11 RX ORDER — AMMONIA INHALANTS 0.04 G/.3ML
0.3 INHALANT RESPIRATORY (INHALATION) AS NEEDED
Status: DISCONTINUED | OUTPATIENT
Start: 2019-01-11 | End: 2019-01-12

## 2019-01-11 RX ORDER — LIDOCAINE HYDROCHLORIDE AND EPINEPHRINE 20; 5 MG/ML; UG/ML
INJECTION, SOLUTION EPIDURAL; INFILTRATION; INTRACAUDAL; PERINEURAL
Status: DISCONTINUED
Start: 2019-01-11 | End: 2019-01-11 | Stop reason: WASHOUT

## 2019-01-11 RX ORDER — BISACODYL 10 MG
10 SUPPOSITORY, RECTAL RECTAL ONCE AS NEEDED
Status: DISCONTINUED | OUTPATIENT
Start: 2019-01-11 | End: 2019-01-12

## 2019-01-11 RX ORDER — SIMETHICONE 80 MG
80 TABLET,CHEWABLE ORAL 3 TIMES DAILY PRN
Status: DISCONTINUED | OUTPATIENT
Start: 2019-01-11 | End: 2019-01-12

## 2019-01-11 RX ORDER — ONDANSETRON 2 MG/ML
4 INJECTION INTRAMUSCULAR; INTRAVENOUS EVERY 6 HOURS PRN
Status: DISCONTINUED | OUTPATIENT
Start: 2019-01-11 | End: 2019-01-12

## 2019-01-11 RX ORDER — SODIUM CHLORIDE, SODIUM LACTATE, POTASSIUM CHLORIDE, CALCIUM CHLORIDE 600; 310; 30; 20 MG/100ML; MG/100ML; MG/100ML; MG/100ML
INJECTION, SOLUTION INTRAVENOUS
Status: COMPLETED
Start: 2019-01-11 | End: 2019-01-11

## 2019-01-11 RX ORDER — DIAPER,BRIEF,INFANT-TODD,DISP
1 EACH MISCELLANEOUS EVERY 6 HOURS PRN
Status: DISCONTINUED | OUTPATIENT
Start: 2019-01-11 | End: 2019-01-12

## 2019-01-11 RX ORDER — DOCUSATE SODIUM 100 MG/1
100 CAPSULE, LIQUID FILLED ORAL 2 TIMES DAILY
Status: DISCONTINUED | OUTPATIENT
Start: 2019-01-11 | End: 2019-01-12

## 2019-01-11 RX ORDER — LIDOCAINE HYDROCHLORIDE AND EPINEPHRINE 20; 5 MG/ML; UG/ML
20 INJECTION, SOLUTION EPIDURAL; INFILTRATION; INTRACAUDAL; PERINEURAL ONCE
Status: DISCONTINUED | OUTPATIENT
Start: 2019-01-11 | End: 2019-01-12

## 2019-01-11 RX ORDER — PRENATAL VIT,CAL 76/IRON/FOLIC 29 MG-1 MG
1 TABLET ORAL DAILY
Status: DISCONTINUED | OUTPATIENT
Start: 2019-01-11 | End: 2019-01-12

## 2019-01-11 RX ORDER — SODIUM CHLORIDE 0.9 % (FLUSH) 0.9 %
10 SYRINGE (ML) INJECTION AS NEEDED
Status: DISCONTINUED | OUTPATIENT
Start: 2019-01-11 | End: 2019-01-12

## 2019-01-11 RX ORDER — LIDOCAINE HYDROCHLORIDE 10 MG/ML
INJECTION, SOLUTION EPIDURAL; INFILTRATION; INTRACAUDAL; PERINEURAL
Status: DISPENSED
Start: 2019-01-11 | End: 2019-01-12

## 2019-01-11 RX ORDER — EPHEDRINE SULFATE/0.9% NACL/PF 25 MG/5 ML
5 SYRINGE (ML) INTRAVENOUS AS NEEDED
Status: DISCONTINUED | OUTPATIENT
Start: 2019-01-11 | End: 2019-01-12

## 2019-01-11 RX ORDER — IBUPROFEN 600 MG/1
600 TABLET ORAL ONCE AS NEEDED
Status: DISCONTINUED | OUTPATIENT
Start: 2019-01-11 | End: 2019-01-11 | Stop reason: HOSPADM

## 2019-01-11 RX ORDER — TRISODIUM CITRATE DIHYDRATE AND CITRIC ACID MONOHYDRATE 500; 334 MG/5ML; MG/5ML
30 SOLUTION ORAL AS NEEDED
Status: DISCONTINUED | OUTPATIENT
Start: 2019-01-11 | End: 2019-01-11 | Stop reason: HOSPADM

## 2019-01-11 RX ORDER — DEXTROSE, SODIUM CHLORIDE, SODIUM LACTATE, POTASSIUM CHLORIDE, AND CALCIUM CHLORIDE 5; .6; .31; .03; .02 G/100ML; G/100ML; G/100ML; G/100ML; G/100ML
INJECTION, SOLUTION INTRAVENOUS
Status: DISPENSED
Start: 2019-01-11 | End: 2019-01-11

## 2019-01-11 RX ORDER — SODIUM CHLORIDE, SODIUM LACTATE, POTASSIUM CHLORIDE, CALCIUM CHLORIDE 600; 310; 30; 20 MG/100ML; MG/100ML; MG/100ML; MG/100ML
INJECTION, SOLUTION INTRAVENOUS CONTINUOUS
Status: DISCONTINUED | OUTPATIENT
Start: 2019-01-11 | End: 2019-01-11 | Stop reason: HOSPADM

## 2019-01-11 RX ORDER — AMMONIA INHALANTS 0.04 G/.3ML
0.3 INHALANT RESPIRATORY (INHALATION) AS NEEDED
Status: DISCONTINUED | OUTPATIENT
Start: 2019-01-11 | End: 2019-01-11 | Stop reason: HOSPADM

## 2019-01-11 RX ORDER — SODIUM CHLORIDE 0.9 % (FLUSH) 0.9 %
10 SYRINGE (ML) INJECTION AS NEEDED
Status: DISCONTINUED | OUTPATIENT
Start: 2019-01-11 | End: 2019-01-11 | Stop reason: HOSPADM

## 2019-01-11 RX ORDER — BUPIVACAINE HYDROCHLORIDE 2.5 MG/ML
10 INJECTION, SOLUTION EPIDURAL; INFILTRATION; INTRACAUDAL ONCE
Status: DISCONTINUED | OUTPATIENT
Start: 2019-01-11 | End: 2019-01-12

## 2019-01-11 RX ORDER — BUPIVACAINE HYDROCHLORIDE 2.5 MG/ML
INJECTION, SOLUTION EPIDURAL; INFILTRATION; INTRACAUDAL
Status: DISCONTINUED
Start: 2019-01-11 | End: 2019-01-11 | Stop reason: WASHOUT

## 2019-01-11 RX ORDER — LIDOCAINE HYDROCHLORIDE 10 MG/ML
30 INJECTION, SOLUTION EPIDURAL; INFILTRATION; INTRACAUDAL; PERINEURAL ONCE
Status: DISCONTINUED | OUTPATIENT
Start: 2019-01-11 | End: 2019-01-11 | Stop reason: HOSPADM

## 2019-01-11 NOTE — PROGRESS NOTES
Pt is a 26year old female admitted to TR2/TR2-A. Patient presents with:  Contractions: started at 0000, increased in intensity at 0300 q5min      Pt is [id number] 39w4d intra-uterine pregnancy. History obtained, consents signed.  Oriented to room, staff, a

## 2019-01-11 NOTE — DISCHARGE SUMMARY
[facility] - [facility]    Discharge Summary    Joe Castillo Patient Status:  Inpatient    1985 MRN [id number]   Location [address] [place] [address] Attending Du Doyle, [address] [facility] Day # 0       Admission date:  19

## 2019-01-11 NOTE — L&D DELIVERY NOTE
[facility]    Vaginal Delivery Note    Colt Potrero Patient Status:  Inpatient    1985 MRN [id number]   Location [address] Attending Adam Kim, [address] [facility] Day # 0 PCP No primary care provider o

## 2019-01-11 NOTE — H&P
Jorge 72 Patient Status:  Inpatient    1985 MRN [id number]   Location [address] [place] [address] G Attending Melinda Lazo, [address] [facility] Day # 0 PCP No primary care provider on f 1 AB  5w0d               Past Medical History:   Past Medical History:   Diagnosis Date   • Borderline anemia     Per pt    • History of chicken pox     in childhood    •  labor    • Tattoos    • Uterine fibroid      Past Social History:   [employer] Expiration Date 5-31-19 Date        ASSESSMENT:    Patient is a [id number] at 39w4d      PLAN:    1. Admit  2. Cefm/toco  3. fwbR  4. GBS neg  5. Labor: expectant mgmt  6. Analgesia-IV narcotics then epidural   7.  POC d/w pt and ; all questio

## 2019-01-12 VITALS
SYSTOLIC BLOOD PRESSURE: 127 MMHG | RESPIRATION RATE: 18 BRPM | DIASTOLIC BLOOD PRESSURE: 70 MMHG | HEART RATE: 99 BPM | TEMPERATURE: 99 F | OXYGEN SATURATION: 97 %

## 2019-01-12 LAB
BASOPHILS # BLD: 0.1 K/UL (ref 0–0.2)
BASOPHILS NFR BLD: 1 %
EOSINOPHIL # BLD: 0.1 K/UL (ref 0–0.7)
EOSINOPHIL NFR BLD: 1 %
ERYTHROCYTE [DISTWIDTH] IN BLOOD BY AUTOMATED COUNT: 18.1 % (ref 11–15)
HCT VFR BLD AUTO: 33.9 % (ref 35–48)
HGB BLD-MCNC: 11 G/DL (ref 12–16)
LYMPHOCYTES # BLD: 2.2 K/UL (ref 1–4)
LYMPHOCYTES NFR BLD: 20 %
MCH RBC QN AUTO: 24 PG (ref 27–32)
MCHC RBC AUTO-ENTMCNC: 32.5 G/DL (ref 32–37)
MCV RBC AUTO: 73.8 FL (ref 80–100)
MONOCYTES # BLD: 1.2 K/UL (ref 0–1)
MONOCYTES NFR BLD: 10 %
NEUTROPHILS # BLD AUTO: 7.9 K/UL (ref 1.8–7.7)
NEUTROPHILS NFR BLD: 69 %
PLATELET # BLD AUTO: 147 K/UL (ref 140–400)
PMV BLD AUTO: 10.1 FL (ref 7.4–10.3)
RBC # BLD AUTO: 4.6 M/UL (ref 3.7–5.4)
WBC # BLD AUTO: 11.5 K/UL (ref 4–11)

## 2019-01-12 PROCEDURE — 85025 COMPLETE CBC W/AUTO DIFF WBC: CPT | Performed by: OBSTETRICS & GYNECOLOGY

## 2019-01-12 NOTE — LACTATION NOTE
LACTATION NOTE - MOTHER           Problems identified  Problems identified: Knowledge deficit    Breastfeeding goal  Breastfeeding goal: To maintain breast milk feeding per patient goal    Maternal Assessment  Bilateral Breasts: Soft;Symmetrical  Bilateral

## 2019-01-12 NOTE — LACTATION NOTE
This note was copied from a baby's chart.   LACTATION NOTE - INFANT    Evaluation Type  Evaluation Type: Inpatient    Problems & Assessment  Infant Assessment: (falls asleep quickly when placed skin to skin, no feeding cues)  Muscle tone: Appropriate for GA

## 2019-01-15 ENCOUNTER — IMMUNIZATION (OUTPATIENT)
Dept: PEDIATRICS CLINIC | Facility: CLINIC | Age: 34
End: 2019-01-15
Payer: COMMERCIAL

## 2019-01-15 DIAGNOSIS — Z23 NEED FOR VACCINATION: ICD-10-CM

## 2019-01-15 PROCEDURE — 90471 IMMUNIZATION ADMIN: CPT | Performed by: PEDIATRICS

## 2019-01-15 PROCEDURE — 90686 IIV4 VACC NO PRSV 0.5 ML IM: CPT | Performed by: PEDIATRICS

## 2019-02-22 ENCOUNTER — POSTPARTUM (OUTPATIENT)
Dept: OBGYN CLINIC | Facility: CLINIC | Age: 34
End: 2019-02-22
Payer: COMMERCIAL

## 2019-02-22 VITALS
HEART RATE: 82 BPM | SYSTOLIC BLOOD PRESSURE: 114 MMHG | DIASTOLIC BLOOD PRESSURE: 72 MMHG | BODY MASS INDEX: 44 KG/M2 | WEIGHT: 281 LBS

## 2019-02-22 PROBLEM — Z36.89 NON-STRESS TEST REACTIVE: Status: RESOLVED | Noted: 2019-01-04 | Resolved: 2019-02-22

## 2019-02-22 PROBLEM — Z37.9 NORMAL LABOR: Status: RESOLVED | Noted: 2019-01-11 | Resolved: 2019-02-22

## 2019-02-22 PROBLEM — Z34.90 PREGNANT (HCC): Status: RESOLVED | Noted: 2019-01-11 | Resolved: 2019-02-22

## 2019-02-22 PROBLEM — O99.210 OBESITY AFFECTING PREGNANCY, ANTEPARTUM (HCC): Status: RESOLVED | Noted: 2018-08-20 | Resolved: 2019-02-22

## 2019-02-22 PROBLEM — Z34.90 PREGNANT: Status: RESOLVED | Noted: 2019-01-11 | Resolved: 2019-02-22

## 2019-02-22 PROBLEM — O99.210 OBESITY AFFECTING PREGNANCY, ANTEPARTUM: Status: RESOLVED | Noted: 2018-08-20 | Resolved: 2019-02-22

## 2019-02-22 PROBLEM — O09.529 ANTEPARTUM MULTIGRAVIDA OF ADVANCED MATERNAL AGE: Status: RESOLVED | Noted: 2018-08-20 | Resolved: 2019-02-22

## 2019-02-22 PROBLEM — Z87.51 HISTORY OF PRETERM DELIVERY: Status: RESOLVED | Noted: 2018-09-17 | Resolved: 2019-02-22

## 2019-02-22 PROBLEM — Z37.9 NORMAL LABOR (HCC): Status: RESOLVED | Noted: 2019-01-11 | Resolved: 2019-02-22

## 2019-02-22 PROBLEM — Z36.89: Status: RESOLVED | Noted: 2019-01-04 | Resolved: 2019-02-22

## 2019-02-22 PROBLEM — O09.529 ANTEPARTUM MULTIGRAVIDA OF ADVANCED MATERNAL AGE (HCC): Status: RESOLVED | Noted: 2018-08-20 | Resolved: 2019-02-22

## 2019-02-22 NOTE — PROGRESS NOTES
Val Eli is a 26year old female [id number] here for 6 week post-partum visit. Patient delivered a  female infant on 19 via . Patient desires permanent sterility for contraception. Patient is breast feeding.    Patient denies symptoms of d well nourished, well developed woman in no acute distress  Abdomen:    soft, nontender, no masses  External Genitalia:  normal appearance, hair distribution, and no lesions  Vagina:    normal appearance without lesions, no abnormal discharge  Cervix:

## 2019-03-12 ENCOUNTER — HOSPITAL ENCOUNTER (OUTPATIENT)
Dept: ULTRASOUND IMAGING | Facility: HOSPITAL | Age: 34
Discharge: HOME | End: 2019-03-12
Attending: OBSTETRICS & GYNECOLOGY
Payer: COMMERCIAL

## 2019-03-12 ENCOUNTER — TELEPHONE (OUTPATIENT)
Dept: OBGYN CLINIC | Facility: CLINIC | Age: 34
End: 2019-03-12

## 2019-03-12 DIAGNOSIS — N92.6 IRREGULAR MENSTRUATION, UNSPECIFIED: Primary | ICD-10-CM

## 2019-03-12 DIAGNOSIS — N92.6 IRREGULAR BLEEDING: ICD-10-CM

## 2019-03-12 PROCEDURE — 76856 US EXAM PELVIC COMPLETE: CPT | Performed by: OBSTETRICS & GYNECOLOGY

## 2019-03-12 PROCEDURE — 76830 TRANSVAGINAL US NON-OB: CPT | Performed by: OBSTETRICS & GYNECOLOGY

## 2019-03-12 NOTE — TELEPHONE ENCOUNTER
No report available yet from [address] done today. Once [facility] reviews results we will contact pt.

## 2019-03-14 NOTE — TELEPHONE ENCOUNTER
Informed pt that [facility] stated no evidence of retained products of conception. Informed pt that the next step would be an endosee in the office, day 7-10 to see if anything missed on ultrasound. Pt stated understanding.   Pt will call with day one of her cyc

## 2019-03-19 NOTE — TELEPHONE ENCOUNTER
Just noticed that patient already scheduled for possible D&C during tubal surgery -- cancel plan for endosee & just add diagnostic hysteroscopy, possible myosure curettage to case. Please inform pt of this.  We cannot do hysteroscopy if pt is on period day

## 2019-03-19 NOTE — TELEPHONE ENCOUNTER
Pt informed of [facility] recs below in regards to canceling endosee and adding on diagnostic hysteroscopy with possible myosure curettage to case already scheduled for 4/8.  Pt instructed to call if she will be on her period during 4/8 and surgery will need to b

## 2019-04-05 ENCOUNTER — TELEPHONE (OUTPATIENT)
Dept: OBGYN CLINIC | Facility: CLINIC | Age: 34
End: 2019-04-05

## 2019-04-05 NOTE — TELEPHONE ENCOUNTER
CALLED PT AGAIN AND SHE SAID SHE CANNOT DO SURGERY ON MON BECAUSE SHE HAS NOBODY TO PICK HER UP. HER  IS UNABLE TO GET THE DAY OFF SO SHE WANTS TO RESCHEDULE FOR ANOTHER TIME.   PT AWARE PADMINI WILL CALL HER NEXT WEEK TO CHOOSE ANOTHER DATE.   (SEE 3-3

## 2019-04-10 NOTE — TELEPHONE ENCOUNTER
Lmtcb.     Calling to see if pt is available on the following dates 4/12 pm, 4/15AM, 4/25pm or 4/26AM.

## 2019-04-25 NOTE — TELEPHONE ENCOUNTER
Lmtcb. Stating I was following up on an inquiry made via my chart regarding pt's availability in May.    Tentative dates 5/14pm,5/15am, 5/23pm, 5/24am.

## 2019-06-13 ENCOUNTER — TELEPHONE (OUTPATIENT)
Dept: OBGYN CLINIC | Facility: CLINIC | Age: 34
End: 2019-06-13

## 2019-06-13 NOTE — TELEPHONE ENCOUNTER
YANNITCB ASKING PT IF SHE CAN MAKE THE 4:00PM APPT TODAY WITH [facility] FOR HER ANNUAL EXAM.  ALSO SENT HER A MESSAGE THROUGH MY CHART.

## 2019-08-20 ENCOUNTER — OFFICE VISIT (OUTPATIENT)
Dept: OBGYN CLINIC | Facility: CLINIC | Age: 34
End: 2019-08-20
Payer: COMMERCIAL

## 2019-08-20 VITALS
WEIGHT: 293 LBS | HEART RATE: 86 BPM | HEIGHT: 66.5 IN | SYSTOLIC BLOOD PRESSURE: 111 MMHG | BODY MASS INDEX: 46.53 KG/M2 | DIASTOLIC BLOOD PRESSURE: 74 MMHG

## 2019-08-20 DIAGNOSIS — Z30.09 BIRTH CONTROL COUNSELING: ICD-10-CM

## 2019-08-20 DIAGNOSIS — Z01.419 WOMEN'S ANNUAL ROUTINE GYNECOLOGICAL EXAMINATION: Primary | ICD-10-CM

## 2019-08-20 PROCEDURE — 99395 PREV VISIT EST AGE 18-39: CPT | Performed by: OBSTETRICS & GYNECOLOGY

## 2019-08-21 LAB — HPV I/H RISK 1 DNA SPEC QL NAA+PROBE: NEGATIVE

## 2019-08-26 NOTE — PROGRESS NOTES
Kevan Suarez is a 37year old female [id number] Patient's last menstrual period was 08/15/2019. Patient presents with:  Gyn Exam: ANNUAL EXAM  Contraception: partner does not want her do to permanent tubal. She does not want anymore kids.  Wishes to review Non-medical: Not on file    Tobacco Use      Smoking status: Never Smoker      Smokeless tobacco: Never Used    Substance and Sexual Activity      Alcohol use: Yes        Comment: socially      Drug use: No      Sexual activity: Not on file    Lifestyle incontinence  Skin/Breast:   denies any breast pain, lumps, or discharge  Neurological:    denies frequent severe headaches  Psychiatric:   denies depression or anxiety, thoughts of harming self or others  Heme/Lymph:    denies easy bruising or bleeding Norethindrone-Eth Estradiol (OVCON-35, 28,) 0.4-35 MG-MCG Oral Tab; Take 1 tablet by mouth daily. Pap w/ HPV done. ASSCP guidelines reviewed. Annual exams encouraged. SBE encouraged. Mammograms once 40.   Discussed options of birthcontrol including o

## 2019-09-30 ENCOUNTER — OFFICE VISIT (OUTPATIENT)
Dept: PODIATRY CLINIC | Facility: CLINIC | Age: 34
End: 2019-09-30
Payer: COMMERCIAL

## 2019-09-30 DIAGNOSIS — B35.1 ONYCHOMYCOSIS: Primary | ICD-10-CM

## 2019-09-30 PROCEDURE — 99203 OFFICE O/P NEW LOW 30 MIN: CPT | Performed by: PODIATRIST

## 2019-09-30 NOTE — PROGRESS NOTES
Joey Rachel is a 37year old female.  Patient presents with:  Toenail Fungus: Right foot - has fungus on 1,2,and 5th toes - no pain - sometimes she noticed drainage         HPI:   This very pleasant patient presents with concerns over her right great to breath with exertion  CARDIOVASCULAR: denies chest pain on exertion  GI: denies abdominal pain and denies heartburn  NEURO: denies headaches    EXAM:   There were no vitals taken for this visit.   Physical Exam  GENERAL: well developed, well nourished, in n

## 2019-10-03 NOTE — TELEPHONE ENCOUNTER
Paged while on call. Returned page. Pt states she has had vomiting on and off and diarrhea. She has no sick contacts at home. She was able to tolerate PO yesterday until last evening when she was vomiting.  Pt felt well enough to go to work this am and now
[NL] : normotonic
[NL] : warm

## 2019-10-18 ENCOUNTER — TELEPHONE (OUTPATIENT)
Dept: ORTHOPEDICS CLINIC | Facility: CLINIC | Age: 34
End: 2019-10-18

## 2019-10-18 DIAGNOSIS — B35.1 ONYCHOMYCOSIS: Primary | ICD-10-CM

## 2019-10-18 NOTE — TELEPHONE ENCOUNTER
----- Message from Michael Guzman DPM sent at 10/14/2019  3:26 PM CDT -----  Hepatic function panel is normal please call in the following prescription:   Lamisil 250 mg tablets  Dispense 45, no refill  Instructions take 1 tablet daily p.o.   Then edwin

## 2019-10-18 NOTE — TELEPHONE ENCOUNTER
WMN - pls advise. I am not finding the hepatic function panel. Should I order one? And then if WNL, order Lamisil PO tx then?

## 2019-10-18 NOTE — TELEPHONE ENCOUNTER
S/w pt. Would like to move fwd with hepatic function panel and lamisil po txMilana august on 17th and [facility].

## 2019-10-24 ENCOUNTER — PATIENT MESSAGE (OUTPATIENT)
Dept: PODIATRY CLINIC | Facility: CLINIC | Age: 34
End: 2019-10-24

## 2019-10-24 DIAGNOSIS — B35.1 ONYCHOMYCOSIS: Primary | ICD-10-CM

## 2019-10-24 NOTE — TELEPHONE ENCOUNTER
From: Monroe Morelos  To: Judith Rojas DPM  Sent: 10/24/2019 3:03 PM CDT  Subject: Test Results Question    Hello,  With my recent results the recommendation was for the Lamisil tablets. Is their an alternate option for treatment.

## 2019-10-25 NOTE — TELEPHONE ENCOUNTER
Please call the patient and let her know that the Lamisil tablet therapy is probably the best and most successful. Topicals at their very best only have about a 20% cure rate where the oral Lamisil tablets probably around 75 to 80%.   If she wants to try a

## 2019-11-25 NOTE — TELEPHONE ENCOUNTER
Please let the patient know that I have prescribed Jublia to [facility]  which handles that medication for a very reasonable amount. She may get a call from them to verify insurance coverage.

## 2020-02-04 ENCOUNTER — APPOINTMENT (OUTPATIENT)
Dept: LAB | Facility: HOSPITAL | Age: 35
End: 2020-02-04
Attending: PODIATRIST
Payer: COMMERCIAL

## 2020-02-04 DIAGNOSIS — B35.1 ONYCHOMYCOSIS: ICD-10-CM

## 2020-02-04 LAB
ALBUMIN SERPL-MCNC: 3.7 G/DL (ref 3.4–5)
ALP LIVER SERPL-CCNC: 53 U/L (ref 37–98)
ALT SERPL-CCNC: 14 U/L (ref 13–56)
AST SERPL-CCNC: 18 U/L (ref 15–37)
BILIRUB DIRECT SERPL-MCNC: <0.1 MG/DL (ref 0–0.2)
BILIRUB SERPL-MCNC: 0.4 MG/DL (ref 0.1–2)
M PROTEIN MFR SERPL ELPH: 7.6 G/DL (ref 6.4–8.2)

## 2020-02-04 PROCEDURE — 80076 HEPATIC FUNCTION PANEL: CPT

## 2020-02-04 PROCEDURE — 36415 COLL VENOUS BLD VENIPUNCTURE: CPT

## 2020-02-05 ENCOUNTER — TELEPHONE (OUTPATIENT)
Dept: PODIATRY CLINIC | Facility: CLINIC | Age: 35
End: 2020-02-05

## 2020-02-05 RX ORDER — TERBINAFINE HYDROCHLORIDE 250 MG/1
250 TABLET ORAL DAILY
Qty: 45 TABLET | Refills: 0 | Status: SHIPPED | OUTPATIENT
Start: 2020-02-05 | End: 2020-09-02

## 2020-02-05 NOTE — TELEPHONE ENCOUNTER
----- Message from Kerri Taylor DPM sent at 2/5/2020 10:23 AM CST -----  Hepatic function panel is normal please call in the following prescription:   Lamisil 250 mg tablets  Dispense 45, no refill  Instructions take 1 tablet daily p.o.   Then please

## 2020-02-06 ENCOUNTER — HOSPITAL ENCOUNTER (EMERGENCY)
Facility: HOSPITAL | Age: 35
Discharge: HOME | End: 2020-02-06
Payer: COMMERCIAL

## 2020-02-06 ENCOUNTER — APPOINTMENT (OUTPATIENT)
Dept: GENERAL RADIOLOGY | Facility: HOSPITAL | Age: 35
End: 2020-02-06
Payer: COMMERCIAL

## 2020-02-06 VITALS
WEIGHT: 293 LBS | BODY MASS INDEX: 47.09 KG/M2 | HEIGHT: 66 IN | SYSTOLIC BLOOD PRESSURE: 128 MMHG | HEART RATE: 87 BPM | TEMPERATURE: 98 F | DIASTOLIC BLOOD PRESSURE: 70 MMHG | RESPIRATION RATE: 19 BRPM | OXYGEN SATURATION: 99 %

## 2020-02-06 DIAGNOSIS — R00.2 PALPITATIONS: Primary | ICD-10-CM

## 2020-02-06 LAB
ANION GAP SERPL CALC-SCNC: 4 MMOL/L (ref 0–18)
BASOPHILS # BLD AUTO: 0.06 X10(3) UL (ref 0–0.2)
BASOPHILS NFR BLD AUTO: 0.6 %
BUN BLD-MCNC: 13 MG/DL (ref 7–18)
BUN/CREAT SERPL: 14 (ref 10–20)
CALCIUM BLD-MCNC: 8.9 MG/DL (ref 8.5–10.1)
CHLORIDE SERPL-SCNC: 111 MMOL/L (ref 98–112)
CO2 SERPL-SCNC: 28 MMOL/L (ref 21–32)
CREAT BLD-MCNC: 0.93 MG/DL (ref 0.55–1.02)
D DIMER PPP FEU-MCNC: 0.47 UG/ML FEU (ref ?–0.5)
DEPRECATED RDW RBC AUTO: 41.2 FL (ref 35.1–46.3)
EOSINOPHIL # BLD AUTO: 0.22 X10(3) UL (ref 0–0.7)
EOSINOPHIL NFR BLD AUTO: 2.2 %
ERYTHROCYTE [DISTWIDTH] IN BLOOD BY AUTOMATED COUNT: 14.8 % (ref 11–15)
GLUCOSE BLD-MCNC: 60 MG/DL (ref 70–99)
GLUCOSE BLDC GLUCOMTR-MCNC: 95 MG/DL (ref 70–99)
HCT VFR BLD AUTO: 37.5 % (ref 35–48)
HGB BLD-MCNC: 11.4 G/DL (ref 12–16)
IMM GRANULOCYTES # BLD AUTO: 0.03 X10(3) UL (ref 0–1)
IMM GRANULOCYTES NFR BLD: 0.3 %
LYMPHOCYTES # BLD AUTO: 2.24 X10(3) UL (ref 1–4)
LYMPHOCYTES NFR BLD AUTO: 22.6 %
MCH RBC QN AUTO: 23.4 PG (ref 26–34)
MCHC RBC AUTO-ENTMCNC: 30.4 G/DL (ref 31–37)
MCV RBC AUTO: 77 FL (ref 80–100)
MONOCYTES # BLD AUTO: 0.7 X10(3) UL (ref 0.1–1)
MONOCYTES NFR BLD AUTO: 7.1 %
NEUTROPHILS # BLD AUTO: 6.67 X10 (3) UL (ref 1.5–7.7)
NEUTROPHILS # BLD AUTO: 6.67 X10(3) UL (ref 1.5–7.7)
NEUTROPHILS NFR BLD AUTO: 67.2 %
OSMOLALITY SERPL CALC.SUM OF ELEC: 294 MOSM/KG (ref 275–295)
PLATELET # BLD AUTO: 303 10(3)UL (ref 150–450)
POTASSIUM SERPL-SCNC: 4 MMOL/L (ref 3.5–5.1)
RBC # BLD AUTO: 4.87 X10(6)UL (ref 3.8–5.3)
SODIUM SERPL-SCNC: 143 MMOL/L (ref 136–145)
TROPONIN I SERPL-MCNC: <0.045 NG/ML (ref ?–0.04)
WBC # BLD AUTO: 9.9 X10(3) UL (ref 4–11)

## 2020-02-06 PROCEDURE — 84484 ASSAY OF TROPONIN QUANT: CPT

## 2020-02-06 PROCEDURE — 85025 COMPLETE CBC W/AUTO DIFF WBC: CPT

## 2020-02-06 PROCEDURE — 82962 GLUCOSE BLOOD TEST: CPT

## 2020-02-06 PROCEDURE — 71046 X-RAY EXAM CHEST 2 VIEWS: CPT

## 2020-02-06 PROCEDURE — 93005 ELECTROCARDIOGRAM TRACING: CPT

## 2020-02-06 PROCEDURE — 99284 EMERGENCY DEPT VISIT MOD MDM: CPT

## 2020-02-06 PROCEDURE — 93010 ELECTROCARDIOGRAM REPORT: CPT | Performed by: INTERNAL MEDICINE

## 2020-02-06 PROCEDURE — 36415 COLL VENOUS BLD VENIPUNCTURE: CPT

## 2020-02-06 PROCEDURE — 80048 BASIC METABOLIC PNL TOTAL CA: CPT

## 2020-02-06 PROCEDURE — 85379 FIBRIN DEGRADATION QUANT: CPT | Performed by: EMERGENCY MEDICINE

## 2020-02-06 NOTE — TELEPHONE ENCOUNTER
Rcvd fax from [place] [phone] in [place] that a prior García Pin is needed. Faxed to [facility].

## 2020-02-06 NOTE — ED INITIAL ASSESSMENT (HPI)
Patient presents to ED with c/o of shortness of breath and palpitations. States this has been happening off and on since last week.

## 2020-02-07 NOTE — ED PROVIDER NOTES
Patient Seen in: [facility] Emergency Department      History   Patient presents with:  Dyspnea XIMENA SOB    Stated Complaint: SOB x20 minutes    HPI    43-year-old female with history of anemia presents with complaints of palpitations and dyspnea. (Oral)   Resp 19   Ht 167.6 cm (5' 6\")   Wt (!) 145.2 kg   LMP 02/01/2020   SpO2 99%   BMI 51.65 kg/m²         Physical Exam      General Appearance: alert, no distress  Eyes: pupils equal and round no pallor or injection  ENT, Mouth: mucous membranes kris results noted. Patient with no dysrhythmias noted while in the ED on the monitor. Will discharge home with plans to follow-up with a primary care physician for further evaluation.               Disposition and Plan     Clinical Impression:  Palpitations

## 2020-02-11 NOTE — TELEPHONE ENCOUNTER
Called [employer] in [place] to get the PA info, as I did not receive any fax. She states no PA needed and the claim has been paid for pt.

## 2020-09-02 ENCOUNTER — OFFICE VISIT (OUTPATIENT)
Dept: INTERNAL MEDICINE CLINIC | Facility: CLINIC | Age: 35
End: 2020-09-02
Payer: COMMERCIAL

## 2020-09-02 VITALS
HEIGHT: 66 IN | WEIGHT: 293 LBS | BODY MASS INDEX: 47.09 KG/M2 | TEMPERATURE: 98 F | SYSTOLIC BLOOD PRESSURE: 129 MMHG | DIASTOLIC BLOOD PRESSURE: 73 MMHG | HEART RATE: 89 BPM

## 2020-09-02 DIAGNOSIS — R00.2 PALPITATIONS: ICD-10-CM

## 2020-09-02 DIAGNOSIS — E66.01 MORBID OBESITY (HCC): ICD-10-CM

## 2020-09-02 DIAGNOSIS — Z00.00 PHYSICAL EXAM: Primary | ICD-10-CM

## 2020-09-02 PROCEDURE — 99395 PREV VISIT EST AGE 18-39: CPT | Performed by: INTERNAL MEDICINE

## 2020-09-02 PROCEDURE — 3074F SYST BP LT 130 MM HG: CPT | Performed by: INTERNAL MEDICINE

## 2020-09-02 PROCEDURE — 3008F BODY MASS INDEX DOCD: CPT | Performed by: INTERNAL MEDICINE

## 2020-09-02 PROCEDURE — 3078F DIAST BP <80 MM HG: CPT | Performed by: INTERNAL MEDICINE

## 2020-09-02 NOTE — PROGRESS NOTES
Salvador Gray is a 38year old female.   Patient presents with:  Physical      HPI:   Pt comes as a new pt --used to be seen at [facility]  C/c physical  C/o right ear pain and has palpitations      working remotely       Lives with 3 daughters and  sinus tenderness, pupils equal reactive to light bilaterally, extraocular muscles intact  NECK: supple,no adenopathy, nontender  LUNGS: clear to auscultation, no wheeze  CARDIO: RRR without murmur  GI: good BS's,no masses or tenderness  EXTREMITIES: no cya

## 2020-10-24 ENCOUNTER — LAB ENCOUNTER (OUTPATIENT)
Dept: LAB | Facility: HOSPITAL | Age: 35
End: 2020-10-24
Attending: INTERNAL MEDICINE
Payer: COMMERCIAL

## 2020-10-24 DIAGNOSIS — Z00.00 PHYSICAL EXAM: ICD-10-CM

## 2020-10-24 PROCEDURE — 36415 COLL VENOUS BLD VENIPUNCTURE: CPT

## 2020-10-24 PROCEDURE — 84443 ASSAY THYROID STIM HORMONE: CPT

## 2020-10-24 PROCEDURE — 85025 COMPLETE CBC W/AUTO DIFF WBC: CPT

## 2020-10-24 PROCEDURE — 80061 LIPID PANEL: CPT

## 2020-10-24 PROCEDURE — 80053 COMPREHEN METABOLIC PANEL: CPT

## 2020-12-14 ENCOUNTER — PATIENT MESSAGE (OUTPATIENT)
Dept: INTERNAL MEDICINE CLINIC | Facility: CLINIC | Age: 35
End: 2020-12-14

## 2020-12-14 ENCOUNTER — NURSE TRIAGE (OUTPATIENT)
Dept: INTERNAL MEDICINE CLINIC | Facility: CLINIC | Age: 35
End: 2020-12-14

## 2020-12-14 NOTE — TELEPHONE ENCOUNTER
From: Magda Celaya  To: Octavio Mckeon MD  Sent: 12/14/2020 3:20 PM CST  Subject: Other    Hello,   I noticed a little faint pain in my belly button last week and today sporadic sharp pain in my lower abdomenon on the right side. No nausea .  Menstrual is

## 2020-12-14 NOTE — TELEPHONE ENCOUNTER
Left message to call back. Please transfer to triage. 1st attempt. Pt has read our [employer]

## 2020-12-14 NOTE — TELEPHONE ENCOUNTER
Irvin Whittaker MD 1 hour ago (3:20 PM)        Hello,   I noticed a little faint pain in my belly button last week and today sporadic sharp pain in my lower abdomenon on the right side. No nausea . Menstrual is expected in 2days.  Had been

## 2020-12-16 NOTE — TELEPHONE ENCOUNTER
Left message to call back. Transfer to triage    Dr. Kenya Donovan.   Please see the original Cycle message. The patient is not calling us back. Please advise.

## 2020-12-16 NOTE — TELEPHONE ENCOUNTER
Patient stated that symptoms resolved on 12/14/2020. Indicated that got her menstrual cycle 2 hours later after sending her [employer] message. Has not had symptoms since, so is figuring it was related to her period.

## 2020-12-27 ENCOUNTER — APPOINTMENT (OUTPATIENT)
Dept: ULTRASOUND IMAGING | Facility: HOSPITAL | Age: 35
End: 2020-12-27
Attending: EMERGENCY MEDICINE
Payer: COMMERCIAL

## 2020-12-27 ENCOUNTER — HOSPITAL ENCOUNTER (EMERGENCY)
Facility: HOSPITAL | Age: 35
Discharge: HOME | End: 2020-12-27
Attending: EMERGENCY MEDICINE
Payer: COMMERCIAL

## 2020-12-27 ENCOUNTER — TELEPHONE (OUTPATIENT)
Dept: INTERNAL MEDICINE CLINIC | Facility: CLINIC | Age: 35
End: 2020-12-27

## 2020-12-27 VITALS
BODY MASS INDEX: 45.99 KG/M2 | WEIGHT: 293 LBS | SYSTOLIC BLOOD PRESSURE: 119 MMHG | HEART RATE: 66 BPM | TEMPERATURE: 98 F | HEIGHT: 67 IN | DIASTOLIC BLOOD PRESSURE: 66 MMHG | OXYGEN SATURATION: 100 % | RESPIRATION RATE: 20 BRPM

## 2020-12-27 DIAGNOSIS — M25.561 ARTHRALGIA OF BOTH LOWER LEGS: Primary | ICD-10-CM

## 2020-12-27 DIAGNOSIS — M25.562 ARTHRALGIA OF BOTH LOWER LEGS: Primary | ICD-10-CM

## 2020-12-27 PROCEDURE — 81025 URINE PREGNANCY TEST: CPT

## 2020-12-27 PROCEDURE — 99284 EMERGENCY DEPT VISIT MOD MDM: CPT

## 2020-12-27 PROCEDURE — 93970 EXTREMITY STUDY: CPT | Performed by: EMERGENCY MEDICINE

## 2020-12-27 RX ORDER — TRAMADOL HYDROCHLORIDE 50 MG/1
50 TABLET ORAL ONCE
Status: COMPLETED | OUTPATIENT
Start: 2020-12-27 | End: 2020-12-27

## 2020-12-27 RX ORDER — TRAMADOL HYDROCHLORIDE 50 MG/1
TABLET ORAL EVERY 6 HOURS PRN
Qty: 10 TABLET | Refills: 0 | Status: SHIPPED | OUTPATIENT
Start: 2020-12-27 | End: 2021-01-03

## 2020-12-27 NOTE — ED PROVIDER NOTES
Patient Seen in: [facility] Emergency Department    History   Patient presents with:  Leg Pain      HPI    49-year-old female presents the ER with bilateral leg pain. Patient states the pain started approximately 2 days ago.   Patient was at [facility] the presenting problem noted.     Physical Exam     ED Triage Vitals   BP 12/27/20 0410 128/84   Pulse 12/27/20 0410 99   Resp 12/27/20 0410 20   Temp 12/27/20 0412 97.9 °F (36.6 °C)   Temp src 12/27/20 0412 Oral   SpO2 12/27/20 0410 100 %   O2 Device -- compression, calf veins partial obscured by body habitus.       ED Medications Administered:   Medications   traMADol HCl (ULTRAM) tab 50 mg (50 mg Oral Given 12/27/20 0443)         MDM      12/27/20 0410 12/27/20 0412   BP: 128/84    Pulse: 99    Resp: 2

## 2020-12-28 ENCOUNTER — NURSE TRIAGE (OUTPATIENT)
Dept: INTERNAL MEDICINE CLINIC | Facility: CLINIC | Age: 35
End: 2020-12-28

## 2020-12-28 NOTE — TELEPHONE ENCOUNTER
Action Requested: Summary for Provider     []  Critical Lab, Recommendations Needed  [] Need Additional Advice  []   FYI    []   Need Orders  [] Need Medications Sent to Pharmacy  []  Other     SUMMARY: Pt c/o nausea and indigestion x 6 days, she denies di

## 2020-12-30 NOTE — TELEPHONE ENCOUNTER
Message # [phone]         2020 03:34a   [[email or web address]]  To:  Lino Adler  From:  Primary MD:  Phone#:  [phone]  PT OF DR Solomon LOVETT,  83, RE THROBBING IN FEET/THIGHS, [phone]

## 2021-01-05 ENCOUNTER — TELEMEDICINE (OUTPATIENT)
Dept: FAMILY MEDICINE CLINIC | Facility: CLINIC | Age: 36
End: 2021-01-05
Payer: COMMERCIAL

## 2021-01-05 DIAGNOSIS — R10.9 ABDOMINAL DISCOMFORT: Primary | ICD-10-CM

## 2021-01-05 PROCEDURE — 99213 OFFICE O/P EST LOW 20 MIN: CPT | Performed by: FAMILY MEDICINE

## 2021-01-06 NOTE — PROGRESS NOTES
HPI:    Patient ID: Joey Rachel is a 38year old female.     Telehealth outside of [facility] [address] Verbal Consent   I conducted a telehealth visit with Joey Rachel today, 01/05/21, which was completed using two-way, real-time interactive audio a helpful. Then she was having calf spasm . .. She went to ER then had us leg and was doing well.    Now no pain   But is concerned now feels the air in the chest and the belly which is     this is the same jenny she went to [address] [place] [address]

## 2021-01-20 ENCOUNTER — OFFICE VISIT (OUTPATIENT)
Dept: INTERNAL MEDICINE CLINIC | Facility: CLINIC | Age: 36
End: 2021-01-20
Payer: COMMERCIAL

## 2021-01-20 VITALS
WEIGHT: 293 LBS | HEIGHT: 67 IN | RESPIRATION RATE: 16 BRPM | SYSTOLIC BLOOD PRESSURE: 138 MMHG | HEART RATE: 78 BPM | DIASTOLIC BLOOD PRESSURE: 79 MMHG | BODY MASS INDEX: 45.99 KG/M2

## 2021-01-20 DIAGNOSIS — K21.9 GASTROESOPHAGEAL REFLUX DISEASE, UNSPECIFIED WHETHER ESOPHAGITIS PRESENT: ICD-10-CM

## 2021-01-20 DIAGNOSIS — R10.13 EPIGASTRIC PAIN: Primary | ICD-10-CM

## 2021-01-20 PROCEDURE — 3075F SYST BP GE 130 - 139MM HG: CPT | Performed by: INTERNAL MEDICINE

## 2021-01-20 PROCEDURE — 99213 OFFICE O/P EST LOW 20 MIN: CPT | Performed by: INTERNAL MEDICINE

## 2021-01-20 PROCEDURE — 3008F BODY MASS INDEX DOCD: CPT | Performed by: INTERNAL MEDICINE

## 2021-01-20 PROCEDURE — 3078F DIAST BP <80 MM HG: CPT | Performed by: INTERNAL MEDICINE

## 2021-01-20 RX ORDER — FAMOTIDINE 20 MG/1
20 TABLET ORAL DAILY
COMMUNITY
Start: 2020-12-26 | End: 2021-01-20 | Stop reason: ALTCHOICE

## 2021-01-20 RX ORDER — PANTOPRAZOLE SODIUM 40 MG/1
40 TABLET, DELAYED RELEASE ORAL
Qty: 30 TABLET | Refills: 1 | Status: SHIPPED | OUTPATIENT
Start: 2021-01-20 | End: 2021-10-15

## 2021-01-20 NOTE — PROGRESS NOTES
Milly Gonsalez is a 38year old female.   Patient presents with:  ER F/U: heartburn  no improvement since ER visit      HPI:   Pt comes for f/u  C/c epigastric pain   C/o above   Went to ER in December for leg pain which is better but also went to [facility] depression    EXAM:   /79 (BP Location: Right arm, Patient Position: Sitting, Cuff Size: large)   Pulse 78   Resp 16   Ht 5' 7\" (1.702 m)   Wt (!) 316 lb (143.3 kg)   LMP 12/14/2020   BMI 49.49 kg/m²   GENERAL: well developed, well nourished,in no a

## 2021-01-22 ENCOUNTER — LAB ENCOUNTER (OUTPATIENT)
Dept: LAB | Facility: HOSPITAL | Age: 36
End: 2021-01-22
Attending: INTERNAL MEDICINE
Payer: COMMERCIAL

## 2021-01-22 DIAGNOSIS — K21.9 GASTROESOPHAGEAL REFLUX DISEASE, UNSPECIFIED WHETHER ESOPHAGITIS PRESENT: ICD-10-CM

## 2021-01-22 DIAGNOSIS — R10.13 EPIGASTRIC PAIN: ICD-10-CM

## 2021-01-22 PROCEDURE — 87338 HPYLORI STOOL AG IA: CPT

## 2021-01-24 LAB — H PYLORI AG STL QL IA: NEGATIVE

## 2021-05-01 ENCOUNTER — IMMUNIZATION (OUTPATIENT)
Dept: LAB | Facility: HOSPITAL | Age: 36
End: 2021-05-01
Attending: EMERGENCY MEDICINE
Payer: COMMERCIAL

## 2021-05-01 DIAGNOSIS — Z23 NEED FOR VACCINATION: Primary | ICD-10-CM

## 2021-05-01 PROCEDURE — 0011A SARSCOV2 VAC 100MCG/0.5ML IM: CPT

## 2021-05-29 ENCOUNTER — IMMUNIZATION (OUTPATIENT)
Dept: LAB | Facility: HOSPITAL | Age: 36
End: 2021-05-29
Attending: EMERGENCY MEDICINE
Payer: COMMERCIAL

## 2021-05-29 DIAGNOSIS — Z23 NEED FOR VACCINATION: Primary | ICD-10-CM

## 2021-05-29 PROCEDURE — 0012A SARSCOV2 VAC 100MCG/0.5ML IM: CPT

## 2021-10-15 ENCOUNTER — HOSPITAL ENCOUNTER (OUTPATIENT)
Dept: GENERAL RADIOLOGY | Facility: HOSPITAL | Age: 36
Discharge: HOME | End: 2021-10-15
Attending: INTERNAL MEDICINE
Payer: COMMERCIAL

## 2021-10-15 ENCOUNTER — OFFICE VISIT (OUTPATIENT)
Dept: INTERNAL MEDICINE CLINIC | Facility: CLINIC | Age: 36
End: 2021-10-15
Payer: COMMERCIAL

## 2021-10-15 VITALS
BODY MASS INDEX: 45.99 KG/M2 | WEIGHT: 293 LBS | DIASTOLIC BLOOD PRESSURE: 74 MMHG | HEIGHT: 67 IN | RESPIRATION RATE: 18 BRPM | SYSTOLIC BLOOD PRESSURE: 120 MMHG | HEART RATE: 78 BPM

## 2021-10-15 DIAGNOSIS — G89.29 CHRONIC RIGHT SHOULDER PAIN: ICD-10-CM

## 2021-10-15 DIAGNOSIS — Z00.00 PHYSICAL EXAM, ANNUAL: Primary | ICD-10-CM

## 2021-10-15 DIAGNOSIS — M25.511 CHRONIC RIGHT SHOULDER PAIN: ICD-10-CM

## 2021-10-15 PROCEDURE — 3008F BODY MASS INDEX DOCD: CPT | Performed by: INTERNAL MEDICINE

## 2021-10-15 PROCEDURE — 3078F DIAST BP <80 MM HG: CPT | Performed by: INTERNAL MEDICINE

## 2021-10-15 PROCEDURE — 3074F SYST BP LT 130 MM HG: CPT | Performed by: INTERNAL MEDICINE

## 2021-10-15 PROCEDURE — 99395 PREV VISIT EST AGE 18-39: CPT | Performed by: INTERNAL MEDICINE

## 2021-10-15 PROCEDURE — 73030 X-RAY EXAM OF SHOULDER: CPT | Performed by: INTERNAL MEDICINE

## 2021-10-15 NOTE — PROGRESS NOTES
Jose Barahona is a 43year old female.   Patient presents with:  Physical  Shoulder Pain: right      HPI:   Pt comes for her physical   C/c annual physical  C/L complains of right shoulder pain for about 4 weeks  No falls trauma or injury  Worse -moving i shortness of breath, cough, wheezing  CARDIOVASCULAR: denies chest pain on exertion, palpitations, swelling in feet  GI: denies abdominal pain and denies heartburn, nausea or vomiting  : No Pain on urination, change in the color of urine, discharge, urin can ice the joint, can take ibuprofen after meals, will hold off on giving cyclobenzaprine as she takes care of her kids and her kids sleep with her and there was no one else at home at night in case she gets some side effects i.e. dizziness or drowsiness

## 2021-11-12 ENCOUNTER — HOSPITAL ENCOUNTER (OUTPATIENT)
Age: 36
Discharge: HOME | End: 2021-11-12
Payer: COMMERCIAL

## 2021-11-12 VITALS
TEMPERATURE: 97 F | RESPIRATION RATE: 20 BRPM | HEART RATE: 87 BPM | DIASTOLIC BLOOD PRESSURE: 76 MMHG | OXYGEN SATURATION: 100 % | SYSTOLIC BLOOD PRESSURE: 143 MMHG

## 2021-11-12 DIAGNOSIS — S50.361A INSECT BITE OF RIGHT ELBOW, INITIAL ENCOUNTER: Primary | ICD-10-CM
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
## 2021-11-12 PROCEDURE — 99213 OFFICE O/P EST LOW 20 MIN: CPT

## 2021-11-12 RX ORDER — CEPHALEXIN 500 MG/1
500 CAPSULE ORAL 4 TIMES DAILY
Qty: 40 CAPSULE | Refills: 0 | Status: SHIPPED | OUTPATIENT
Start: 2021-11-12 | End: 2021-11-22

## 2021-11-12 RX ORDER — PREDNISONE 20 MG/1
40 TABLET ORAL DAILY
Qty: 10 TABLET | Refills: 0 | Status: SHIPPED | OUTPATIENT
Start: 2021-11-12 | End: 2021-11-17

## 2021-11-12 NOTE — ED PROVIDER NOTES
Patient presents with:  Derm Problem      HPI:     Ralph Hernandez is a 43year old female who presents today with a chief complaint of an insect bite to the right elbow.   She states she was putting her daughter to bed 2 nights ago and felt something bite file  Housing Stability: Not on file      ROS:   Positive for stated complaint: insect bite, redness, swelling, itching  All other systems reviewed and negative except as noted above. Constitutional and Vital Signs Reviewed.       Physical Exam:     Rash: follow-up closely to have the area rechecked with her primary care doctor. She is aware if she develops any fevers or other worsening or concerning symptoms, to go to the nearest emergency department for further evaluation.     Diagnosis:    ICD-10-CM    1

## 2022-04-22 ENCOUNTER — PATIENT MESSAGE (OUTPATIENT)
Dept: INTERNAL MEDICINE CLINIC | Facility: CLINIC | Age: 37
End: 2022-04-22

## 2022-04-22 ENCOUNTER — NURSE TRIAGE (OUTPATIENT)
Dept: INTERNAL MEDICINE CLINIC | Facility: CLINIC | Age: 37
End: 2022-04-22

## 2022-04-27 ENCOUNTER — OFFICE VISIT (OUTPATIENT)
Dept: INTERNAL MEDICINE CLINIC | Facility: CLINIC | Age: 37
End: 2022-04-27
Payer: COMMERCIAL

## 2022-04-27 VITALS
DIASTOLIC BLOOD PRESSURE: 64 MMHG | SYSTOLIC BLOOD PRESSURE: 108 MMHG | RESPIRATION RATE: 18 BRPM | HEART RATE: 87 BPM | BODY MASS INDEX: 50 KG/M2 | HEIGHT: 67 IN

## 2022-04-27 DIAGNOSIS — N64.4 BREAST PAIN: Primary | ICD-10-CM

## 2022-04-27 PROCEDURE — 3008F BODY MASS INDEX DOCD: CPT | Performed by: INTERNAL MEDICINE

## 2022-04-27 PROCEDURE — 3078F DIAST BP <80 MM HG: CPT | Performed by: INTERNAL MEDICINE

## 2022-04-27 PROCEDURE — 3074F SYST BP LT 130 MM HG: CPT | Performed by: INTERNAL MEDICINE

## 2022-04-27 PROCEDURE — 99213 OFFICE O/P EST LOW 20 MIN: CPT | Performed by: INTERNAL MEDICINE

## 2022-05-06 ENCOUNTER — HOSPITAL ENCOUNTER (OUTPATIENT)
Dept: MAMMOGRAPHY | Facility: HOSPITAL | Age: 37
Discharge: HOME | End: 2022-05-06
Attending: INTERNAL MEDICINE
Payer: COMMERCIAL

## 2022-05-06 DIAGNOSIS — N64.4 BREAST PAIN: ICD-10-CM

## 2022-05-06 PROCEDURE — 77066 DX MAMMO INCL CAD BI: CPT | Performed by: INTERNAL MEDICINE

## 2022-05-06 PROCEDURE — 77062 BREAST TOMOSYNTHESIS BI: CPT | Performed by: INTERNAL MEDICINE

## 2022-12-06 ENCOUNTER — OFFICE VISIT (OUTPATIENT)
Dept: INTERNAL MEDICINE CLINIC | Facility: CLINIC | Age: 37
End: 2022-12-06
Payer: COMMERCIAL

## 2022-12-06 VITALS
HEART RATE: 88 BPM | HEIGHT: 67 IN | SYSTOLIC BLOOD PRESSURE: 127 MMHG | DIASTOLIC BLOOD PRESSURE: 72 MMHG | WEIGHT: 293 LBS | BODY MASS INDEX: 45.99 KG/M2

## 2022-12-06 DIAGNOSIS — Z00.00 PHYSICAL EXAM, ANNUAL: Primary | ICD-10-CM

## 2022-12-06 DIAGNOSIS — Z01.419 ENCOUNTER FOR ROUTINE GYNECOLOGICAL EXAMINATION WITH PAPANICOLAOU SMEAR OF CERVIX: ICD-10-CM

## 2022-12-06 PROCEDURE — 3008F BODY MASS INDEX DOCD: CPT | Performed by: INTERNAL MEDICINE

## 2022-12-06 PROCEDURE — 3078F DIAST BP <80 MM HG: CPT | Performed by: INTERNAL MEDICINE

## 2022-12-06 PROCEDURE — 3074F SYST BP LT 130 MM HG: CPT | Performed by: INTERNAL MEDICINE

## 2022-12-06 PROCEDURE — 99395 PREV VISIT EST AGE 18-39: CPT | Performed by: INTERNAL MEDICINE

## 2022-12-06 RX ORDER — MULTIVITAMIN
TABLET ORAL
COMMUNITY

## 2022-12-07 LAB — HPV I/H RISK 1 DNA SPEC QL NAA+PROBE: NEGATIVE

## 2023-01-18 ENCOUNTER — OFFICE VISIT (OUTPATIENT)
Dept: SURGERY | Facility: CLINIC | Age: 38
End: 2023-01-18
Payer: COMMERCIAL

## 2023-01-18 ENCOUNTER — LAB ENCOUNTER (OUTPATIENT)
Dept: LAB | Facility: HOSPITAL | Age: 38
End: 2023-01-18
Attending: INTERNAL MEDICINE
Payer: COMMERCIAL

## 2023-01-18 VITALS
BODY MASS INDEX: 46.53 KG/M2 | HEART RATE: 78 BPM | DIASTOLIC BLOOD PRESSURE: 86 MMHG | SYSTOLIC BLOOD PRESSURE: 134 MMHG | OXYGEN SATURATION: 100 % | WEIGHT: 293 LBS | HEIGHT: 66.5 IN

## 2023-01-18 DIAGNOSIS — R12 HEART BURN: Primary | ICD-10-CM

## 2023-01-18 DIAGNOSIS — R63.5 WEIGHT GAIN: ICD-10-CM

## 2023-01-18 DIAGNOSIS — E66.01 MORBID OBESITY WITH BMI OF 50.0-59.9, ADULT (HCC): ICD-10-CM

## 2023-01-18 DIAGNOSIS — R60.9 LIPEDEMA: ICD-10-CM

## 2023-01-18 DIAGNOSIS — Z00.00 PHYSICAL EXAM, ANNUAL: ICD-10-CM

## 2023-01-18 LAB
ALBUMIN SERPL-MCNC: 3.5 G/DL (ref 3.4–5)
ALBUMIN/GLOB SERPL: 0.9 {RATIO} (ref 1–2)
ALP LIVER SERPL-CCNC: 58 U/L
ALT SERPL-CCNC: 16 U/L
ANION GAP SERPL CALC-SCNC: 4 MMOL/L (ref 0–18)
AST SERPL-CCNC: 11 U/L (ref 15–37)
BILIRUB SERPL-MCNC: 0.5 MG/DL (ref 0.1–2)
BUN BLD-MCNC: 11 MG/DL (ref 7–18)
BUN/CREAT SERPL: 12.4 (ref 10–20)
CALCIUM BLD-MCNC: 8.9 MG/DL (ref 8.5–10.1)
CHLORIDE SERPL-SCNC: 106 MMOL/L (ref 98–112)
CHOLEST SERPL-MCNC: 106 MG/DL (ref ?–200)
CO2 SERPL-SCNC: 28 MMOL/L (ref 21–32)
CREAT BLD-MCNC: 0.89 MG/DL
DEPRECATED RDW RBC AUTO: 43.8 FL (ref 35.1–46.3)
ERYTHROCYTE [DISTWIDTH] IN BLOOD BY AUTOMATED COUNT: 15.8 % (ref 11–15)
FASTING PATIENT LIPID ANSWER: YES
FASTING STATUS PATIENT QL REPORTED: YES
GFR SERPLBLD BASED ON 1.73 SQ M-ARVRAT: 85 ML/MIN/1.73M2 (ref 60–?)
GLOBULIN PLAS-MCNC: 4.1 G/DL (ref 2.8–4.4)
GLUCOSE BLD-MCNC: 88 MG/DL (ref 70–99)
HCT VFR BLD AUTO: 36.3 %
HDLC SERPL-MCNC: 53 MG/DL (ref 40–59)
HGB BLD-MCNC: 10.9 G/DL
LDLC SERPL CALC-MCNC: 39 MG/DL (ref ?–100)
MCH RBC QN AUTO: 22.9 PG (ref 26–34)
MCHC RBC AUTO-ENTMCNC: 30 G/DL (ref 31–37)
MCV RBC AUTO: 76.1 FL
NONHDLC SERPL-MCNC: 53 MG/DL (ref ?–130)
OSMOLALITY SERPL CALC.SUM OF ELEC: 285 MOSM/KG (ref 275–295)
PLATELET # BLD AUTO: 283 10(3)UL (ref 150–450)
POTASSIUM SERPL-SCNC: 4 MMOL/L (ref 3.5–5.1)
PROT SERPL-MCNC: 7.6 G/DL (ref 6.4–8.2)
RBC # BLD AUTO: 4.77 X10(6)UL
SODIUM SERPL-SCNC: 138 MMOL/L (ref 136–145)
TRIGL SERPL-MCNC: 63 MG/DL (ref 30–149)
TSI SER-ACNC: 2.32 MIU/ML (ref 0.36–3.74)
VLDLC SERPL CALC-MCNC: 9 MG/DL (ref 0–30)
WBC # BLD AUTO: 7.5 X10(3) UL (ref 4–11)

## 2023-01-18 PROCEDURE — 3008F BODY MASS INDEX DOCD: CPT | Performed by: INTERNAL MEDICINE

## 2023-01-18 PROCEDURE — 80061 LIPID PANEL: CPT

## 2023-01-18 PROCEDURE — 3079F DIAST BP 80-89 MM HG: CPT | Performed by: INTERNAL MEDICINE

## 2023-01-18 PROCEDURE — 36415 COLL VENOUS BLD VENIPUNCTURE: CPT

## 2023-01-18 PROCEDURE — 85027 COMPLETE CBC AUTOMATED: CPT

## 2023-01-18 PROCEDURE — 3075F SYST BP GE 130 - 139MM HG: CPT | Performed by: INTERNAL MEDICINE

## 2023-01-18 PROCEDURE — 84443 ASSAY THYROID STIM HORMONE: CPT

## 2023-01-18 PROCEDURE — 99204 OFFICE O/P NEW MOD 45 MIN: CPT | Performed by: INTERNAL MEDICINE

## 2023-01-18 PROCEDURE — 80053 COMPREHEN METABOLIC PANEL: CPT

## 2023-01-18 RX ORDER — PHENTERMINE HYDROCHLORIDE 15 MG/1
15 CAPSULE ORAL EVERY MORNING
Qty: 30 CAPSULE | Refills: 2 | Status: SHIPPED | OUTPATIENT
Start: 2023-01-18

## 2023-01-18 RX ORDER — TOPIRAMATE 25 MG/1
25 TABLET ORAL EVERY EVENING
Qty: 30 TABLET | Refills: 2 | Status: SHIPPED | OUTPATIENT
Start: 2023-01-18

## 2023-07-27 ENCOUNTER — OFFICE VISIT (OUTPATIENT)
Dept: SURGERY | Facility: CLINIC | Age: 38
End: 2023-07-27
Payer: COMMERCIAL

## 2023-07-27 VITALS
HEART RATE: 81 BPM | DIASTOLIC BLOOD PRESSURE: 80 MMHG | SYSTOLIC BLOOD PRESSURE: 130 MMHG | WEIGHT: 293 LBS | BODY MASS INDEX: 46.53 KG/M2 | OXYGEN SATURATION: 98 % | HEIGHT: 66.5 IN

## 2023-07-27 DIAGNOSIS — R12 HEART BURN: Primary | ICD-10-CM

## 2023-07-27 DIAGNOSIS — R60.9 LIPEDEMA: ICD-10-CM

## 2023-07-27 DIAGNOSIS — E66.01 MORBID OBESITY WITH BMI OF 50.0-59.9, ADULT (HCC): ICD-10-CM

## 2023-07-27 PROCEDURE — 3008F BODY MASS INDEX DOCD: CPT | Performed by: INTERNAL MEDICINE

## 2023-07-27 PROCEDURE — 3079F DIAST BP 80-89 MM HG: CPT | Performed by: INTERNAL MEDICINE

## 2023-07-27 PROCEDURE — 99214 OFFICE O/P EST MOD 30 MIN: CPT | Performed by: INTERNAL MEDICINE

## 2023-07-27 PROCEDURE — 3075F SYST BP GE 130 - 139MM HG: CPT | Performed by: INTERNAL MEDICINE

## 2023-07-27 RX ORDER — PHENTERMINE HYDROCHLORIDE 30 MG/1
30 CAPSULE ORAL EVERY MORNING
Qty: 30 CAPSULE | Refills: 2 | Status: SHIPPED | OUTPATIENT
Start: 2023-07-27

## 2023-07-27 RX ORDER — TOPIRAMATE 25 MG/1
25 TABLET ORAL EVERY EVENING
Qty: 90 TABLET | Refills: 1 | Status: SHIPPED | OUTPATIENT
Start: 2023-07-27 | End: 2023-10-25

## 2023-07-27 RX ORDER — PEN NEEDLE, DIABETIC 30 GX3/16"
1 NEEDLE, DISPOSABLE MISCELLANEOUS DAILY
Qty: 90 EACH | Refills: 0 | Status: SHIPPED | OUTPATIENT
Start: 2023-07-27 | End: 2023-10-25

## 2023-07-27 RX ORDER — LIRAGLUTIDE 6 MG/ML
INJECTION, SOLUTION SUBCUTANEOUS
Qty: 3 ML | Refills: 2 | Status: SHIPPED | OUTPATIENT
Start: 2023-07-27 | End: 2023-09-23

## 2024-01-30 ENCOUNTER — HOSPITAL ENCOUNTER (OUTPATIENT)
Dept: MAMMOGRAPHY | Age: 39
Discharge: HOME | End: 2024-01-30
Attending: INTERNAL MEDICINE
Payer: COMMERCIAL

## 2024-01-30 DIAGNOSIS — Z12.31 SCREENING MAMMOGRAM FOR BREAST CANCER: ICD-10-CM

## 2024-01-30 PROCEDURE — 77067 SCR MAMMO BI INCL CAD: CPT | Performed by: INTERNAL MEDICINE

## 2024-01-30 PROCEDURE — 77063 BREAST TOMOSYNTHESIS BI: CPT | Performed by: INTERNAL MEDICINE

## 2024-02-15 ENCOUNTER — OFFICE VISIT (OUTPATIENT)
Dept: FAMILY MEDICINE CLINIC | Facility: CLINIC | Age: 39
End: 2024-02-15
Payer: COMMERCIAL

## 2024-02-15 VITALS
OXYGEN SATURATION: 98 % | HEIGHT: 66.5 IN | BODY MASS INDEX: 46.53 KG/M2 | HEART RATE: 78 BPM | TEMPERATURE: 98 F | SYSTOLIC BLOOD PRESSURE: 135 MMHG | DIASTOLIC BLOOD PRESSURE: 42 MMHG | RESPIRATION RATE: 16 BRPM | WEIGHT: 293 LBS

## 2024-02-15 DIAGNOSIS — J32.9 SINUSITIS, UNSPECIFIED CHRONICITY, UNSPECIFIED LOCATION: Primary | ICD-10-CM

## 2024-02-15 PROCEDURE — 3078F DIAST BP <80 MM HG: CPT | Performed by: NURSE PRACTITIONER

## 2024-02-15 PROCEDURE — 99213 OFFICE O/P EST LOW 20 MIN: CPT | Performed by: NURSE PRACTITIONER

## 2024-02-15 PROCEDURE — 3075F SYST BP GE 130 - 139MM HG: CPT | Performed by: NURSE PRACTITIONER

## 2024-02-15 PROCEDURE — 3008F BODY MASS INDEX DOCD: CPT | Performed by: NURSE PRACTITIONER

## 2024-02-15 RX ORDER — AMOXICILLIN AND CLAVULANATE POTASSIUM 875; 125 MG/1; MG/1
1 TABLET, FILM COATED ORAL 2 TIMES DAILY
Qty: 14 TABLET | Refills: 0 | Status: SHIPPED | OUTPATIENT
Start: 2024-02-15 | End: 2024-02-22

## 2024-02-15 NOTE — PROGRESS NOTES
CHIEF COMPLAINT:     Chief Complaint   Patient presents with    Sinus Problem     Sx 3 months - Sinus pressure, R ear pressure, nasal drainage  Denies SOB, chest pain, cough, fever, ST  No Covid test was done  OTC Allegra, Sudafed, Mucinex       HPI:   Meli Quinonez is a 38 year old female who presents for upper respiratory symptoms for  3 months. Symptoms worse in last month Patient reports  rhinorrhea, sinus pressure, ear pressure, and headaches . Symptoms have been worse since onset.  Treating symptoms with allegra, sudafed, mucinex.   Associated symptoms include see above.    No home covid testings.     Current Outpatient Medications   Medication Sig Dispense Refill    amoxicillin clavulanate 875-125 MG Oral Tab Take 1 tablet by mouth 2 (two) times daily for 7 days. 14 tablet 0    Phentermine HCl 30 MG Oral Cap Take 1 capsule (30 mg total) by mouth every morning. 30 capsule 2    Multiple Vitamin (MULTI-VITAMIN DAILY) Oral Tab Take by mouth.        Past Medical History:   Diagnosis Date    Borderline anemia     Per pt     History of chicken pox     in childhood      labor     Tattoos     Uterine fibroid       Past Surgical History:   Procedure Laterality Date    BREAST LUMPECTOMY      Right breast,     LOIS LOCALIZATION WIRE 1 SITE RIGHT (CPT=19281)      WISDOM TEETH REMOVED           Social History     Socioeconomic History    Marital status:    Tobacco Use    Smoking status: Never    Smokeless tobacco: Never   Vaping Use    Vaping Use: Never used   Substance and Sexual Activity    Alcohol use: Yes     Comment: socially    Drug use: No         REVIEW OF SYSTEMS:   GENERAL: good appetite No fevers.   SKIN: no rashes or abnormal skin lesions  HEENT: See HPI. No vision changes.   LUNGS: denies shortness of breath or wheezing, See HPI  CARDIOVASCULAR: denies chest pain or palpitations   GI: denies N/V/C or abdominal pain  NEURO: + headaches    EXAM:   /42 (BP Location: Right arm)   Pulse 78    Temp 98.4 °F (36.9 °C)   Resp 16   Ht 5' 6.5\" (1.689 m)   Wt (!) 357 lb (161.9 kg)   LMP 02/06/2024   SpO2 98%   Breastfeeding No   BMI 56.76 kg/m²   GENERAL: well developed, well nourished,in no apparent distress  SKIN: no rashes,no suspicious lesions  HEAD: atraumatic, normocephalic.  + tenderness on palpation of  sinuses  EYES: conjunctiva clear, EOM intact  EARS: TM's gray, no bulging, no retraction,no fluid, bony landmarks visible  NOSE: Nostrils patent, clear nasal discharge, nasal mucosa pink   THROAT: Oral mucosa pink, moist. Posterior pharynx is not erythematous. no exudates. Tonsils 1/4.    NECK: Supple, non-tender  LUNGS: clear to auscultation bilaterally, no wheezes or rhonchi. Breathing is non labored.  CARDIO: RRR without murmur  EXTREMITIES: no cyanosis, clubbing or edema  LYMPH:  no lymphadenopathy.        ASSESSMENT AND PLAN:   Meli Quinonez is a 38 year old female who presents with upper respiratory symptoms that are consistent with    ASSESSMENT:   Encounter Diagnosis   Name Primary?    Sinusitis, unspecified chronicity, unspecified location Yes       PLAN: Meds as below.  Comfort care as described in Patient Instructions    Meds & Refills for this Visit:  Requested Prescriptions     Signed Prescriptions Disp Refills    amoxicillin clavulanate 875-125 MG Oral Tab 14 tablet 0     Sig: Take 1 tablet by mouth 2 (two) times daily for 7 days.     Will treat with Augmentin as directed.     Warm compress to face multiple times per day.     Warm steamy showers.     Discussed s/s of worsening infection/condition with Patient and importance of prompt medical re-evaluation including when to seek emergency care. Patient voiced understanding    Increase fluids and rest.     May consider OTC tylenol or ibuprofen as needed and directed on packaging for pain/fever    May consider OTC guaifenesin as needed and directed on packaging to thin mucus secretions.    May consider OTC phenylephrine or  pseudoephedrine as needed and directed on packaging as a nasal decongestant    Risks, benefits, and side effects of medication discussed. Patient verbalized understanding and agreement with treatment plan.     All questions and concerns addressed. Encouraged Patient to call clinic with any questions or concerns.  Patient Instructions   Flonase in nostrils     Warm compresses to face    Follow up with ear nose throat doctor in 1-2 weeks.     The patient indicates understanding of these issues and agrees to the plan.  The patient is asked to return if sx's persist or worsen.

## 2024-02-15 NOTE — PATIENT INSTRUCTIONS
Flonase in nostrils     Warm compresses to face    Follow up with ear nose throat doctor in 1-2 weeks.

## 2024-05-02 ENCOUNTER — OFFICE VISIT (OUTPATIENT)
Dept: INTERNAL MEDICINE CLINIC | Facility: CLINIC | Age: 39
End: 2024-05-02

## 2024-05-02 VITALS
RESPIRATION RATE: 18 BRPM | WEIGHT: 293 LBS | BODY MASS INDEX: 46.53 KG/M2 | SYSTOLIC BLOOD PRESSURE: 136 MMHG | HEIGHT: 66.5 IN | OXYGEN SATURATION: 98 % | HEART RATE: 82 BPM | DIASTOLIC BLOOD PRESSURE: 84 MMHG

## 2024-05-02 DIAGNOSIS — D50.9 IRON DEFICIENCY ANEMIA, UNSPECIFIED IRON DEFICIENCY ANEMIA TYPE: ICD-10-CM

## 2024-05-02 DIAGNOSIS — J02.9 SORE THROAT: ICD-10-CM

## 2024-05-02 DIAGNOSIS — N92.0 MENORRHAGIA WITH REGULAR CYCLE: ICD-10-CM

## 2024-05-02 DIAGNOSIS — R00.2 PALPITATIONS: ICD-10-CM

## 2024-05-02 DIAGNOSIS — Z00.00 PHYSICAL EXAM, ANNUAL: Primary | ICD-10-CM

## 2024-05-02 DIAGNOSIS — H92.01 DISCOMFORT OF RIGHT EAR: ICD-10-CM

## 2024-05-02 DIAGNOSIS — E66.01 MORBID OBESITY WITH BMI OF 50.0-59.9, ADULT (HCC): ICD-10-CM

## 2024-05-02 LAB
CONTROL LINE PRESENT WITH A CLEAR BACKGROUND (YES/NO): YES YES/NO
KIT LOT #: NORMAL NUMERIC

## 2024-05-02 PROCEDURE — 87880 STREP A ASSAY W/OPTIC: CPT | Performed by: INTERNAL MEDICINE

## 2024-05-02 PROCEDURE — 99213 OFFICE O/P EST LOW 20 MIN: CPT | Performed by: INTERNAL MEDICINE

## 2024-05-02 PROCEDURE — 3008F BODY MASS INDEX DOCD: CPT | Performed by: INTERNAL MEDICINE

## 2024-05-02 PROCEDURE — 3075F SYST BP GE 130 - 139MM HG: CPT | Performed by: INTERNAL MEDICINE

## 2024-05-02 PROCEDURE — 99396 PREV VISIT EST AGE 40-64: CPT | Performed by: INTERNAL MEDICINE

## 2024-05-02 PROCEDURE — 3079F DIAST BP 80-89 MM HG: CPT | Performed by: INTERNAL MEDICINE

## 2024-05-02 RX ORDER — PHENTERMINE HYDROCHLORIDE 30 MG/1
30 CAPSULE ORAL EVERY MORNING
Qty: 30 CAPSULE | Refills: 2 | Status: CANCELLED | OUTPATIENT
Start: 2024-05-02

## 2024-05-02 NOTE — PROGRESS NOTES
Meli Quinonez is a 38 year old female.  Chief Complaint   Patient presents with    Physical     annual       HPI:   Patient comes for annual physical  C/C annual physical  C/o doing well except for her sinuses and noted that she was seen in the urgent care in February for sinus issues and was given antibiotics and also since then has been trying Sudafed Mucinex and Zyrtec but she still has symptoms  Came back from spring break  and feels like there is a little bump behind her right ear  Also has a hoarseness and soreness in her throat and her daughter was diagnosed with strep and of April                HISTORY   2021   Went to ER in December for leg pain which is better but also went to [facility] before that   Felt like indigestion and pulling -- was getting better but now for the last couple days more   Worse after meals   The day she went to ER she was fasting from 6 am to 5 pm -- excpet coffee- she drank about 1-2 cups daily   Famotidine helped -- was given only 14 days   Now taking pepcid and gas ex   Noted at [facility] and seen in care everywhere that she had an EKG and chest x-ray both were essentially within normal limits           working remotely      Lives with 3 daughters and  who works nights   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 30 capsule 2    Multiple Vitamin (MULTI-VITAMIN DAILY) Oral Tab Take by mouth.        Past Medical History:    Borderline anemia    Per pt     History of chicken pox    in childhood      labor (HCC)    Tattoos    Uterine fibroid      Past Surgical History:   Procedure Laterality Date    Breast lumpectomy      Right breast,     Gabe localization wire 1 site right (cpt=19281)      [place] teeth removed        Social History:  Social History     Socioeconomic History     Marital status:    Tobacco Use    Smoking status: Never    Smokeless tobacco: Never   Vaping Use    Vaping status: Never Used   Substance and Sexual Activity    Alcohol use: Yes     Comment: socially    Drug use: No        REVIEW OF SYSTEMS:   GENERAL HEALTH: No fevers, chills, sweats, fatigue  VISION: No recent vision problems, blurry vision or double vision  HEENT: No decreased hearing ear pain but feels a popping and whooshing sound in the right ear, + nasal congestion, no sore throat  SKIN: denies any unusual skin lesions or rashes  RESPIRATORY: denies shortness of breath, cough, wheezing  CARDIOVASCULAR: denies chest pain on exertion, + palpitations-aware to discuss with her weight management doctor regarding the phentermine which she has not been taking,no swelling in feet  GI: denies abdominal pain and denies heartburn, nausea or vomiting  : No Pain on urination, change in the color of urine, discharge, urinating frequently  MUS: No back pain, joint pain, muscle pain  NEURO: denies headaches , anxiety, depression    EXAM:   /84 (BP Location: Right arm, Patient Position: Sitting, Cuff Size: large)   Pulse 82   Resp 18   Ht 5' 6.5\" (1.689 m)   Wt (!) 359 lb 9.6 oz (163.1 kg)   LMP 04/21/2024 (Exact Date)   SpO2 98%   BMI 57.17 kg/m²   GENERAL: well developed, well nourished,in no apparent distress  SKIN: no rashes,no suspicious lesions  HEENT: atraumatic, normocephalic,ears- right ear difficult to see TM  and throat are clear, no frontal or maxillary sinus tenderness, pupils equal reactive to light bilaterally, extraocular muscles intact  NECK: supple,no adenopathy, nontender   LUNGS: clear to auscultation, no wheeze  CARDIO: RRR without murmur  GI: good BS's,no masses or tenderness  EXTREMITIES: no cyanosis, or edema    ASSESSMENT AND PLAN:   Diagnoses and all orders for this visit:    Physical exam, annual  -     CBC With Differential With Platelet; Future  -     Comp Metabolic Panel  (14); Future  -     Lipid Panel; Future  -     Assay, Thyroid Stim Hormone; Future  -     Iron And Tibc [E]; Future  -     Ferritin [E]; Future  -     Thyroxine, Free [E]; Future  -     Free T3 (Triiodothryronine); Future  Advised patient to watch what she eats and exercise, seatbelt use no texting driving, sunscreen use advised    Morbid obesity with BMI of 50.0-59.9, adult (HCC)  F/u dr tate     Iron deficiency anemia, unspecified iron deficiency anemia type  -     CBC With Differential With Platelet; Future  -     Iron And Tibc [E]; Future  -     Ferritin [E]; Future  -     OBG - INTERNAL  And   Menorrhagia with regular cycle  -     OBG - INTERNAL  Recheck for anemia and and if it is lower then can consider sending for colonoscopy  She does state that she has heavy periods with clots and would benefit from gyn eval     Palpitations  -     Thyroxine, Free [E]; Future  -     Free T3 (Triiodothryronine); Future  Recheck   Sore throat  -     POC Rapid Strep [19952]  Strep test in office neg     Discomfort of right ear  -     ENT - INTERNAL  Refer since this has been going on for a while         Preventive medicine  Pap smear done August 2019 Dr. Alex and 2022 by me normal   Mammogram 1/2024   Labs 10/2020 , new ones ordered to be done once fasting  Flu shot - declined        The patient indicates understanding of these issues and agrees to the plan.  No follow-ups on file.

## 2024-05-03 ENCOUNTER — LAB ENCOUNTER (OUTPATIENT)
Dept: LAB | Facility: HOSPITAL | Age: 39
End: 2024-05-03
Attending: INTERNAL MEDICINE
Payer: COMMERCIAL

## 2024-05-03 DIAGNOSIS — Z00.00 PHYSICAL EXAM, ANNUAL: ICD-10-CM

## 2024-05-03 DIAGNOSIS — D50.9 IRON DEFICIENCY ANEMIA, UNSPECIFIED IRON DEFICIENCY ANEMIA TYPE: ICD-10-CM

## 2024-05-03 DIAGNOSIS — R00.2 PALPITATIONS: ICD-10-CM

## 2024-05-03 LAB
ALBUMIN SERPL-MCNC: 4.3 G/DL (ref 3.2–4.8)
ALBUMIN/GLOB SERPL: 1.5 {RATIO} (ref 1–2)
ALP LIVER SERPL-CCNC: 52 U/L
ALT SERPL-CCNC: <7 U/L
ANION GAP SERPL CALC-SCNC: 7 MMOL/L (ref 0–18)
AST SERPL-CCNC: 14 U/L (ref ?–34)
BASOPHILS # BLD AUTO: 0.08 X10(3) UL (ref 0–0.2)
BASOPHILS NFR BLD AUTO: 1.1 %
BILIRUB SERPL-MCNC: 0.7 MG/DL (ref 0.3–1.2)
BUN BLD-MCNC: 7 MG/DL (ref 9–23)
BUN/CREAT SERPL: 8.1 (ref 10–20)
CALCIUM BLD-MCNC: 9.5 MG/DL (ref 8.7–10.4)
CHLORIDE SERPL-SCNC: 107 MMOL/L (ref 98–112)
CHOLEST SERPL-MCNC: 123 MG/DL (ref ?–200)
CO2 SERPL-SCNC: 27 MMOL/L (ref 21–32)
CREAT BLD-MCNC: 0.86 MG/DL
DEPRECATED HBV CORE AB SER IA-ACNC: 7.1 NG/ML
DEPRECATED RDW RBC AUTO: 42.8 FL (ref 35.1–46.3)
EGFRCR SERPLBLD CKD-EPI 2021: 88 ML/MIN/1.73M2 (ref 60–?)
EOSINOPHIL # BLD AUTO: 0.67 X10(3) UL (ref 0–0.7)
EOSINOPHIL NFR BLD AUTO: 9.1 %
ERYTHROCYTE [DISTWIDTH] IN BLOOD BY AUTOMATED COUNT: 15.9 % (ref 11–15)
FASTING PATIENT LIPID ANSWER: YES
FASTING STATUS PATIENT QL REPORTED: YES
GLOBULIN PLAS-MCNC: 2.8 G/DL (ref 2–3.5)
GLUCOSE BLD-MCNC: 90 MG/DL (ref 70–99)
HCT VFR BLD AUTO: 35.5 %
HDLC SERPL-MCNC: 47 MG/DL (ref 40–59)
HGB BLD-MCNC: 10.7 G/DL
IMM GRANULOCYTES # BLD AUTO: 0.02 X10(3) UL (ref 0–1)
IMM GRANULOCYTES NFR BLD: 0.3 %
IRON SATN MFR SERPL: 14 %
IRON SERPL-MCNC: 60 UG/DL
LDLC SERPL CALC-MCNC: 63 MG/DL (ref ?–100)
LYMPHOCYTES # BLD AUTO: 2.3 X10(3) UL (ref 1–4)
LYMPHOCYTES NFR BLD AUTO: 31.1 %
MCH RBC QN AUTO: 22.6 PG (ref 26–34)
MCHC RBC AUTO-ENTMCNC: 30.1 G/DL (ref 31–37)
MCV RBC AUTO: 74.9 FL
MONOCYTES # BLD AUTO: 0.63 X10(3) UL (ref 0.1–1)
MONOCYTES NFR BLD AUTO: 8.5 %
NEUTROPHILS # BLD AUTO: 3.7 X10 (3) UL (ref 1.5–7.7)
NEUTROPHILS # BLD AUTO: 3.7 X10(3) UL (ref 1.5–7.7)
NEUTROPHILS NFR BLD AUTO: 49.9 %
NONHDLC SERPL-MCNC: 76 MG/DL (ref ?–130)
OSMOLALITY SERPL CALC.SUM OF ELEC: 290 MOSM/KG (ref 275–295)
PLATELET # BLD AUTO: 283 10(3)UL (ref 150–450)
POTASSIUM SERPL-SCNC: 4.2 MMOL/L (ref 3.5–5.1)
PROT SERPL-MCNC: 7.1 G/DL (ref 5.7–8.2)
RBC # BLD AUTO: 4.74 X10(6)UL
SODIUM SERPL-SCNC: 141 MMOL/L (ref 136–145)
T3FREE SERPL-MCNC: 3 PG/ML (ref 2.4–4.2)
T4 FREE SERPL-MCNC: 0.9 NG/DL (ref 0.8–1.7)
TIBC SERPL-MCNC: 414 UG/DL (ref 250–425)
TRANSFERRIN SERPL-MCNC: 278 MG/DL (ref 250–380)
TRIGL SERPL-MCNC: 60 MG/DL (ref 30–149)
TSI SER-ACNC: 1.65 MIU/ML (ref 0.55–4.78)
VLDLC SERPL CALC-MCNC: 9 MG/DL (ref 0–30)
WBC # BLD AUTO: 7.4 X10(3) UL (ref 4–11)

## 2024-05-03 PROCEDURE — 84443 ASSAY THYROID STIM HORMONE: CPT

## 2024-05-03 PROCEDURE — 84466 ASSAY OF TRANSFERRIN: CPT

## 2024-05-03 PROCEDURE — 85025 COMPLETE CBC W/AUTO DIFF WBC: CPT

## 2024-05-03 PROCEDURE — 80053 COMPREHEN METABOLIC PANEL: CPT

## 2024-05-03 PROCEDURE — 36415 COLL VENOUS BLD VENIPUNCTURE: CPT

## 2024-05-03 PROCEDURE — 84481 FREE ASSAY (FT-3): CPT

## 2024-05-03 PROCEDURE — 80061 LIPID PANEL: CPT

## 2024-05-03 PROCEDURE — 82728 ASSAY OF FERRITIN: CPT

## 2024-05-03 PROCEDURE — 83540 ASSAY OF IRON: CPT

## 2024-05-03 PROCEDURE — 84439 ASSAY OF FREE THYROXINE: CPT

## 2024-05-17 ENCOUNTER — OFFICE VISIT (OUTPATIENT)
Dept: OTOLARYNGOLOGY | Facility: CLINIC | Age: 39
End: 2024-05-17

## 2024-05-17 DIAGNOSIS — J34.3 HYPERTROPHY OF BOTH INFERIOR NASAL TURBINATES: ICD-10-CM

## 2024-05-17 DIAGNOSIS — R09.81 NASAL CONGESTION: ICD-10-CM

## 2024-05-17 DIAGNOSIS — J30.9 CHRONIC ALLERGIC RHINITIS: Primary | ICD-10-CM

## 2024-05-17 DIAGNOSIS — H92.01 RIGHT EAR PAIN: ICD-10-CM

## 2024-05-17 RX ORDER — FLUTICASONE PROPIONATE 50 MCG
2 SPRAY, SUSPENSION (ML) NASAL 2 TIMES DAILY
Qty: 16 G | Refills: 3 | Status: SHIPPED | OUTPATIENT
Start: 2024-05-17

## 2024-05-17 RX ORDER — MONTELUKAST SODIUM 10 MG/1
10 TABLET ORAL NIGHTLY
Qty: 30 TABLET | Refills: 3 | Status: SHIPPED | OUTPATIENT
Start: 2024-05-17

## 2024-05-17 RX ORDER — AZELASTINE 1 MG/ML
2 SPRAY, METERED NASAL 2 TIMES DAILY
Qty: 30 ML | Refills: 0 | Status: SHIPPED | OUTPATIENT
Start: 2024-05-17 | End: 2024-05-17

## 2024-05-17 RX ORDER — AZELASTINE 1 MG/ML
2 SPRAY, METERED NASAL 2 TIMES DAILY
Qty: 90 ML | Refills: 0 | Status: SHIPPED | OUTPATIENT
Start: 2024-05-17

## 2024-05-17 NOTE — PROGRESS NOTES
Meli Quinonez is a 38 year old female.   Chief Complaint   Patient presents with    Sinus Problem     Chronic congestion and pressure    Ear Problem     Pain behind ears       ASSESSMENT AND PLAN:   1. Chronic allergic rhinitis  38-year-old presents with chronic nasal congestion and trouble breathing through her nose.  She also reports intermittent right ear pain.  These have been issues for several years.  She says that she has tried various over-the-counter therapies that have not improved her symptoms.    On exam she has prominent inferior turbinate hypertrophy on both sides.  I did not see any polyps on the left on the nasal endoscopy.  Her septum appeared to be deviated to the right I was unable to pass the nasal endoscope posteriorly on the right side.  I did not see any otitis or effusion.    She has very congested inferior turbinates indicative of chronic allergies.  Appears that she is very blocked with her nasal breathing.  Sometimes this can cause more mouth breathing and strain on the jaw joint which can cause ear pain.  Otherwise she had a normal ear exam. There was no fluid or otitis. Will try to get her breathing better through her nose with a combination of Flonase and Astelin nasal spray, Singulair and Claritin-D and will refer her to an allergist as well for allergy testing.  Could consider turbinate reduction or procedure to help her with her breathing if she is still not improving. Consult from Dr Montez regarding ear evaluation. Will provide longitudinal care for her chronic nasal and ear issues with follow up and possible treatment alterations    - Allergy Referral - Siobhan ([facility]bert)  - loratadine-pseudoephedrine ER  MG Oral Tablet 24 Hr; Take 1 tablet by mouth at bedtime.  Dispense: 30 tablet; Refill: 1    2. Hypertrophy of both inferior nasal turbinates      3. Nasal congestion      4. Right ear pain        The patient indicates understanding of these issues and agrees to the  plan.      EXAM:   LMP 2024 (Exact Date)     Pertinent exam findings may also be noted above in assessment and plan     System Details   Skin Inspection - Normal.   Constitutional Overall appearance - Normal.   Head/Face Symmetric, TMJ tenderness not present    Eyes EOMI, PERRL   Right ear:  Canal clear, TM intact, no ELINA   Left ear:  Canal clear, TM intact, no ELINA   Nose: Septum midline, inferior turbinates not enlarged, nasal valves without collapse    Oral cavity/Oropharynx: No lesions or masses on inspection or palpation, tonsils symmetric    Neck: Soft without LAD, thyroid not enlarged  Voice clear/ no stridor   Other:      Scopes and Procedures:             Current Outpatient Medications   Medication Sig Dispense Refill    azelastine 0.1 % Nasal Solution 2 sprays by Nasal route 2 (two) times daily. 30 mL 0    fluticasone propionate 50 MCG/ACT Nasal Suspension 2 sprays by Nasal route in the morning and 2 sprays before bedtime. 16 g 3    montelukast 10 MG Oral Tab Take 1 tablet (10 mg total) by mouth nightly. 30 tablet 3    loratadine-pseudoephedrine ER  MG Oral Tablet 24 Hr Take 1 tablet by mouth at bedtime. 30 tablet 1    Phentermine HCl 30 MG Oral Cap Take 1 capsule (30 mg total) by mouth every morning. 30 capsule 2    Multiple Vitamin (MULTI-VITAMIN DAILY) Oral Tab Take by mouth.        Past Medical History:    Borderline anemia    Per pt     History of chicken pox    in childhood      labor (HCC)    Tattoos    Uterine fibroid      Social History:  Social History     Socioeconomic History    Marital status:    Tobacco Use    Smoking status: Never    Smokeless tobacco: Never   Vaping Use    Vaping status: Never Used   Substance and Sexual Activity    Alcohol use: Yes     Comment: socially    Drug use: No          Sohan Fishman MD  2024  10:15 AM

## 2024-06-24 DIAGNOSIS — E66.01 MORBID OBESITY WITH BMI OF 50.0-59.9, ADULT (HCC): ICD-10-CM

## 2024-06-24 RX ORDER — PHENTERMINE HYDROCHLORIDE 30 MG/1
30 CAPSULE ORAL EVERY MORNING
Qty: 30 CAPSULE | Refills: 2 | OUTPATIENT
Start: 2024-06-24

## 2024-06-27 ENCOUNTER — OFFICE VISIT (OUTPATIENT)
Dept: SURGERY | Facility: CLINIC | Age: 39
End: 2024-06-27

## 2024-06-27 VITALS
OXYGEN SATURATION: 97 % | SYSTOLIC BLOOD PRESSURE: 130 MMHG | BODY MASS INDEX: 46.53 KG/M2 | HEART RATE: 88 BPM | WEIGHT: 293 LBS | HEIGHT: 66.5 IN | DIASTOLIC BLOOD PRESSURE: 88 MMHG

## 2024-06-27 DIAGNOSIS — R12 HEART BURN: ICD-10-CM

## 2024-06-27 DIAGNOSIS — E66.01 MORBID OBESITY WITH BMI OF 50.0-59.9, ADULT (HCC): ICD-10-CM

## 2024-06-27 DIAGNOSIS — R60.9 LIPEDEMA: Primary | ICD-10-CM

## 2024-06-27 PROCEDURE — 3075F SYST BP GE 130 - 139MM HG: CPT | Performed by: INTERNAL MEDICINE

## 2024-06-27 PROCEDURE — 3008F BODY MASS INDEX DOCD: CPT | Performed by: INTERNAL MEDICINE

## 2024-06-27 PROCEDURE — 3079F DIAST BP 80-89 MM HG: CPT | Performed by: INTERNAL MEDICINE

## 2024-06-27 PROCEDURE — 99215 OFFICE O/P EST HI 40 MIN: CPT | Performed by: INTERNAL MEDICINE

## 2024-06-27 RX ORDER — TOPIRAMATE 25 MG/1
25 TABLET ORAL EVERY EVENING
Qty: 30 TABLET | Refills: 5 | Status: SHIPPED | OUTPATIENT
Start: 2024-06-27

## 2024-06-27 RX ORDER — PHENTERMINE HYDROCHLORIDE 30 MG/1
30 CAPSULE ORAL EVERY MORNING
Qty: 30 CAPSULE | Refills: 5 | Status: SHIPPED | OUTPATIENT
Start: 2024-06-27

## 2024-06-27 NOTE — PROGRESS NOTES
[facility], [facility], [place]  [address]  [facility] [address]  Dept: [phone]       Patient:  Meli Quinonez  :      1985  MRN:      [id number]    Chief Complaint:    Chief Complaint   Patient presents with    Follow - Up    Weight Management     Weight check        SUBJECTIVE     History of Present Illness:  Meli is being seen today for a follow-up for non surgical weight loss.     Past Medical History:   Past Medical History:    Borderline anemia    Per pt     History of chicken pox    in childhood      labor (HCC)    Tattoos    Uterine fibroid        Comorbidities:  Back pain-Improvement?  yes, Joint pain-Improvement?  yes, KEN-Improvement?  yes, and Snoring-Improvement?  yes    OBJECTIVE     Vitals: /88   Pulse 88   Ht 5' 6.5\" (1.689 m)   Wt (!) 365 lb 3.2 oz (165.7 kg)   LMP 2024 (Exact Date)   SpO2 97%   BMI 58.06 kg/m²     Initial weight loss: +21   Total weight loss: +08    Start weight: 358    Wt Readings from Last 3 Encounters:   24 (!) 365 lb 3.2 oz (165.7 kg)   24 (!) 359 lb 9.6 oz (163.1 kg)   02/15/24 (!) 357 lb (161.9 kg)       Patient Medications:    Current Outpatient Medications   Medication Sig Dispense Refill    Phentermine HCl 30 MG Oral Cap Take 1 capsule (30 mg total) by mouth every morning. 30 capsule 5    topiramate 25 MG Oral Tab Take 1 tablet (25 mg total) by mouth every evening. 30 tablet 5    semaglutide-weight management 0.25 MG/0.5ML Subcutaneous Solution Auto-injector Inject 0.5 mL (0.25 mg total) into the skin once a week for 4 doses. 2 mL 3    fluticasone propionate 50 MCG/ACT Nasal Suspension 2 sprays by Nasal route in the morning and 2 sprays before bedtime. 16 g 3    montelukast 10 MG Oral Tab Take 1 tablet (10 mg total) by mouth nightly. 30 tablet 3    loratadine-pseudoephedrine ER  MG Oral Tablet 24 Hr Take 1 tablet by mouth at bedtime. 30 tablet 1    AZELASTINE  0.1 % Nasal Solution USE 2 SPRAYS IN EACH NOSTRIL TWICE DAILY 90 mL 0    Multiple Vitamin (MULTI-VITAMIN DAILY) Oral Tab Take by mouth.       Allergies:  Fish allergy     Social History:    Social History     Socioeconomic History    Marital status:      Spouse name: Not on file    Number of children: Not on file    Years of education: Not on file    Highest education level: Not on file   Occupational History    Not on file   Tobacco Use    Smoking status: Never    Smokeless tobacco: Never   Vaping Use    Vaping status: Never Used   Substance and Sexual Activity    Alcohol use: Yes     Comment: socially    Drug use: No    Sexual activity: Not on file   Other Topics Concern    Not on file   Social History Narrative    Not on file     Social Determinants of Health     Financial Resource Strain: Not on file   Food Insecurity: Not on file   Transportation Needs: Not on file   Physical Activity: Not on file   Stress: Not on file   Social Connections: Not on file   Housing Stability: Not on file     Surgical History:    Past Surgical History:   Procedure Laterality Date    Breast lumpectomy      Right breast, 2016    Gabe localization wire 1 site right (cpt=19281)      [place] teeth removed       Family History:    Family History   Problem Relation Age of Onset    Cancer Maternal Grandmother         lung     Breast Cancer Maternal Grandmother 65    Diabetes Maternal Grandfather     Cancer Paternal Grandmother         bladder     Diabetes Paternal Grandmother        Food Journal  Reviewed and Discussed:       Patient has a Food Journal?: yes   Patient is reading nutrition labels?  yes  Average Caloric Intake:     Average CHO Intake: 120  Is patient exercising? yes  Type of exercise? Goes to the gym  Treadmill   Bike weights    Eating Habits  Patient states the following:  Eats 3 meal(s) per day  Length of time it takes to consume a meal:  20  # of snacks per day: 1 Type of snacks:  fruit  Amount of soda consumption  per day:  occasional   Amount of water (in ounces) per day:  64  Drinking between meals only:  yes  Toughest challenge:  sweet tooth    Nutritional Goals  Limit carbohydrates to 100 gms per day, Eat 100-200 calories within 1 hour of waking , and Eat 3-4 cups of fresh fruits or vegetables daily    Behavior Modifications Reviewed and Discussed  Eat breakfast, Eat 3 meals per day, Plan meals in advance, Read nutrition labels, Drink 64 oz of water per day, Maintain a daily food journal, No drinking 30 minutes before or after meals, Utlize portion control strategies to reduce calorie intake, Identify triggers for eating and manage cues, and Eat slowly and take 20 to 30 minutes to complete each meal    Exercise Goals Reviewed and Discussed    Continue PF    ROS:    Constitutional: negative  Respiratory: negative  Cardiovascular: negative  Gastrointestinal: negative  Musculoskeletal:negative  Neurological: negative  Behavioral/Psych: negative  Endocrine: negative  All other systems were reviewed and are negative    Physical Exam:   General appearance: alert, appears stated age, cooperative, and moderately obese  Head: Normocephalic, without obvious abnormality, atraumatic  Back: symmetric, no curvature. ROM normal. No CVA tenderness.  Lungs: clear to auscultation bilaterally  Heart: S1, S2 normal, no murmur, click, rub or gallop, regular rate and rhythm  Abdomen:  soft, obese, non tender  Extremities: ,swelling in both legs  Pulses: 2+ and symmetric  Skin: Skin color, texture, turgor normal. No rashes or lesions  Neurologic: Grossly normal    ASSESSMENT       Encounter Diagnosis(ses):   Encounter Diagnoses   Name Primary?    Lipedema Yes    Heart burn     Morbid obesity with BMI of 50.0-59.9, adult (HCC)        PLAN     Patient is not interested in bariatric surgery. Patient desires to pursue traditional weight loss at this time.      GERD: The patient believes her reflux is somewhat better of at least no worse on current  medication. She was encouraged to avoid caffeine products, elevated head of bed and not eat for 1 hour before bedtime. No interval changes were made in her anti-reflux medications.     Lipedema: continue with walking   Shake plate    Goals for next month:  1. Keep a food log.  2. Drink 48-64 ounces of non-caloric beverages per day. No fruit juices or regular soda.  3. Increase activity-upper body exercises, walk 10 minutes per day.  4. Increase fruit and vegetable servings to 5-6 per day.      Should come to seminar    Restart Phentermine 30 mg    Continue Topiramate    Start Wegovy        Diagnoses and all orders for this visit:    Lipedema    Heart burn    Morbid obesity with BMI of 50.0-59.9, adult (HCC)  -     Phentermine HCl 30 MG Oral Cap; Take 1 capsule (30 mg total) by mouth every morning.  -     topiramate 25 MG Oral Tab; Take 1 tablet (25 mg total) by mouth every evening.  -     semaglutide-weight management 0.25 MG/0.5ML Subcutaneous Solution Auto-injector; Inject 0.5 mL (0.25 mg total) into the skin once a week for 4 doses.  -     Bariatric Surgial Seminar; Future          Orion Khan MD

## 2024-07-09 DIAGNOSIS — E66.01 MORBID OBESITY WITH BMI OF 50.0-59.9, ADULT (HCC): ICD-10-CM

## 2024-07-09 RX ORDER — PHENTERMINE HYDROCHLORIDE 15 MG/1
15 CAPSULE ORAL EVERY MORNING
Qty: 30 CAPSULE | Refills: 2 | Status: SHIPPED | OUTPATIENT
Start: 2024-07-09

## 2024-07-13 ENCOUNTER — HOSPITAL ENCOUNTER (EMERGENCY)
Facility: HOSPITAL | Age: 39
Discharge: HOME | End: 2024-07-13
Attending: STUDENT IN AN ORGANIZED HEALTH CARE EDUCATION/TRAINING PROGRAM
Payer: COMMERCIAL

## 2024-07-13 ENCOUNTER — APPOINTMENT (OUTPATIENT)
Dept: GENERAL RADIOLOGY | Facility: HOSPITAL | Age: 39
End: 2024-07-13
Attending: STUDENT IN AN ORGANIZED HEALTH CARE EDUCATION/TRAINING PROGRAM
Payer: COMMERCIAL

## 2024-07-13 VITALS
HEART RATE: 63 BPM | TEMPERATURE: 98 F | BODY MASS INDEX: 47.09 KG/M2 | RESPIRATION RATE: 14 BRPM | WEIGHT: 293 LBS | OXYGEN SATURATION: 100 % | HEIGHT: 66 IN | DIASTOLIC BLOOD PRESSURE: 63 MMHG | SYSTOLIC BLOOD PRESSURE: 119 MMHG

## 2024-07-13 DIAGNOSIS — R00.2 PALPITATIONS: Primary | ICD-10-CM

## 2024-07-13 LAB
ANION GAP SERPL CALC-SCNC: 4 MMOL/L (ref 0–18)
ATRIAL RATE: 87 BPM
BASOPHILS # BLD AUTO: 0.06 X10(3) UL (ref 0–0.2)
BASOPHILS NFR BLD AUTO: 0.7 %
BUN BLD-MCNC: 11 MG/DL (ref 9–23)
BUN/CREAT SERPL: 11.5 (ref 10–20)
CALCIUM BLD-MCNC: 8.9 MG/DL (ref 8.7–10.4)
CHLORIDE SERPL-SCNC: 110 MMOL/L (ref 98–112)
CO2 SERPL-SCNC: 28 MMOL/L (ref 21–32)
CREAT BLD-MCNC: 0.96 MG/DL
DEPRECATED RDW RBC AUTO: 43.4 FL (ref 35.1–46.3)
EGFRCR SERPLBLD CKD-EPI 2021: 77 ML/MIN/1.73M2 (ref 60–?)
EOSINOPHIL # BLD AUTO: 0.4 X10(3) UL (ref 0–0.7)
EOSINOPHIL NFR BLD AUTO: 4.7 %
ERYTHROCYTE [DISTWIDTH] IN BLOOD BY AUTOMATED COUNT: 16.2 % (ref 11–15)
GLUCOSE BLD-MCNC: 87 MG/DL (ref 70–99)
HCT VFR BLD AUTO: 33.2 %
HGB BLD-MCNC: 10.1 G/DL
IMM GRANULOCYTES # BLD AUTO: 0.02 X10(3) UL (ref 0–1)
IMM GRANULOCYTES NFR BLD: 0.2 %
LYMPHOCYTES # BLD AUTO: 3.13 X10(3) UL (ref 1–4)
LYMPHOCYTES NFR BLD AUTO: 37.1 %
MCH RBC QN AUTO: 22.4 PG (ref 26–34)
MCHC RBC AUTO-ENTMCNC: 30.4 G/DL (ref 31–37)
MCV RBC AUTO: 73.8 FL
MONOCYTES # BLD AUTO: 0.62 X10(3) UL (ref 0.1–1)
MONOCYTES NFR BLD AUTO: 7.3 %
NEUTROPHILS # BLD AUTO: 4.21 X10 (3) UL (ref 1.5–7.7)
NEUTROPHILS # BLD AUTO: 4.21 X10(3) UL (ref 1.5–7.7)
NEUTROPHILS NFR BLD AUTO: 50 %
OSMOLALITY SERPL CALC.SUM OF ELEC: 293 MOSM/KG (ref 275–295)
P AXIS: 71 DEGREES
P-R INTERVAL: 154 MS
PLATELET # BLD AUTO: 262 10(3)UL (ref 150–450)
POTASSIUM SERPL-SCNC: 4.1 MMOL/L (ref 3.5–5.1)
Q-T INTERVAL: 402 MS
QRS DURATION: 134 MS
QTC CALCULATION (BEZET): 483 MS
R AXIS: 102 DEGREES
RBC # BLD AUTO: 4.5 X10(6)UL
SODIUM SERPL-SCNC: 142 MMOL/L (ref 136–145)
T AXIS: 45 DEGREES
TROPONIN I SERPL HS-MCNC: 10 NG/L
VENTRICULAR RATE: 87 BPM
WBC # BLD AUTO: 8.4 X10(3) UL (ref 4–11)

## 2024-07-13 PROCEDURE — 99284 EMERGENCY DEPT VISIT MOD MDM: CPT

## 2024-07-13 PROCEDURE — 93010 ELECTROCARDIOGRAM REPORT: CPT

## 2024-07-13 PROCEDURE — 85025 COMPLETE CBC W/AUTO DIFF WBC: CPT | Performed by: STUDENT IN AN ORGANIZED HEALTH CARE EDUCATION/TRAINING PROGRAM

## 2024-07-13 PROCEDURE — 36415 COLL VENOUS BLD VENIPUNCTURE: CPT

## 2024-07-13 PROCEDURE — 93005 ELECTROCARDIOGRAM TRACING: CPT

## 2024-07-13 PROCEDURE — 84484 ASSAY OF TROPONIN QUANT: CPT | Performed by: STUDENT IN AN ORGANIZED HEALTH CARE EDUCATION/TRAINING PROGRAM

## 2024-07-13 PROCEDURE — 80048 BASIC METABOLIC PNL TOTAL CA: CPT | Performed by: STUDENT IN AN ORGANIZED HEALTH CARE EDUCATION/TRAINING PROGRAM

## 2024-07-13 PROCEDURE — 99285 EMERGENCY DEPT VISIT HI MDM: CPT

## 2024-07-13 PROCEDURE — 71045 X-RAY EXAM CHEST 1 VIEW: CPT | Performed by: STUDENT IN AN ORGANIZED HEALTH CARE EDUCATION/TRAINING PROGRAM

## 2024-07-13 NOTE — ED PROVIDER NOTES
Patient Seen in: [facility] Emergency Department      History     Chief Complaint   Patient presents with    Chest Pain Angina    Difficulty Breathing     Stated Complaint: Shortnes of breath, heart palpitations, ride side numbness    Subjective:   HPI    38-year-old female with history of obesity recently started on phentermine and topiramate presenting for evaluation of palpitations.  She had several days of intermittent palpitations and difficulty breathing.  She states that she feels the symptoms when she is trying to go to bed at night.  She denies associated chest pain tightness pressure discomfort.  Does relate that she has been under increased stress due to home issues.  Also notes that reportedly symptoms started she had began to take phentermine for the first time.    Objective:   Past Medical History:    Borderline anemia    Per pt     History of chicken pox    in childhood      labor (HCC)    Tattoos    Uterine fibroid              Past Surgical History:   Procedure Laterality Date    Breast lumpectomy      Right breast,     Gabe localization wire 1 site right (cpt=19281)      [place] teeth removed                  Social History     Socioeconomic History    Marital status:    Tobacco Use    Smoking status: Never    Smokeless tobacco: Never   Vaping Use    Vaping status: Never Used   Substance and Sexual Activity    Alcohol use: Yes     Comment: socially    Drug use: No              Review of Systems    Positive for stated Chief Complaint: Chest Pain Angina and Difficulty Breathing    Other systems are as noted in HPI.  Constitutional and vital signs reviewed.      All other systems reviewed and negative except as noted above.    Physical Exam     ED Triage Vitals [24 0240]   /79   Pulse 82   Resp 18   Temp 98.2 °F (36.8 °C)   Temp src Oral   SpO2 99 %   O2 Device None (Room air)       Current Vitals:   Vital Signs  BP: 119/63  Pulse: 63  Resp: 14  Temp: 98.2 °F (36.8  °C)  Temp src: Oral  MAP (mmHg): 80    Oxygen Therapy  SpO2: 100 %  O2 Device: None (Room air)            Physical Exam    Constitutional: awake, alert, no sig distress  HENT: mmm, no lesions,  Neck: normal range of motion, no tenderness, supple.  Eyes: PERRL, EOMI, conjunctiva normal, no discharge. Sclera anicteric.  Cardiovascular: rr no murmur  Respiratory: Normal breath sounds, no respiratory distress, no wheezing, no chest tenderness.  GI: Bowel sounds normal, Soft, no tenderness, no masses, no pulsatile masses.  : No CVA tenderness.  Skin: Warm, dry, no erythema, no rash.  Musculoskeletal: Intact distal pulses, no edema, no tenderness, no cyanosis, no clubbing. Good range of motion in all major joints. No tenderness to palpation or major deformities noted. Back- No tenderness.  Neurologic: Alert & oriented x 3, normal motor function, normal sensory function, no focal deficits noted.  Psych: Calm, cooperative, nl affect    ED Course     Labs Reviewed   CBC W/ DIFFERENTIAL - Abnormal; Notable for the following components:       Result Value    HGB 10.1 (*)     HCT 33.2 (*)     MCV 73.8 (*)     MCH 22.4 (*)     MCHC 30.4 (*)     RDW 16.2 (*)     All other components within normal limits   BASIC METABOLIC PANEL (8) - Normal   TROPONIN I HIGH SENSITIVITY - Normal   CBC WITH DIFFERENTIAL WITH PLATELET    Narrative:     The following orders were created for panel order CBC With Differential With Platelet.  Procedure                               Abnormality         Status                     ---------                               -----------         ------                     CBC W/ DIFFERENTIAL[[id number]]          Abnormal            Final result                 Please view results for these tests on the individual orders.   RAINBOW DRAW LAVENDER   RAINBOW DRAW LIGHT GREEN   RAINBOW DRAW BLUE   RAINBOW DRAW GOLD                   ED Course as of 07/13/24  0457  ------------------------------------------------------------  Time: 07/13 0314  Comment: EKG as interpreted by ED physician: NSR 87 BPM, normal axis, RBBB, no st segment changes, no stemi.Qtc 483ms  ------------------------------------------------------------  Time: 07/13 0455  Comment: I have independently reviewed patient's chest x-ray do not appreciate any acute cardiopulmonary findings.              MDM      38-year-old female with history as documented above presenting for evaluation of difficulty breathing and palpitations on arrival vitals are stable and reassuring.  DDx: Medication adverse effect, anxiety, arrhythmia, metabolic derangement, acute coronary syndrome  Plan labs troponin ECG chest x-ray      I have independently reviewed patient's chest x-ray do not appreciate any acute cardiopulmonary findings.  Return precautions and follow-up instructions were discussed with patient who voiced understanding and agreement the plan.  All questions were answered to patient satisfaction.                           Medical Decision Making      Disposition and Plan     Clinical Impression:  1. Palpitations         Disposition:  Discharge  7/13/2024  4:57 am    Follow-up:  Mariangel Montez MD  [address] [facility] [id number] [phone]    Follow up in 2 day(s)      [facility] Emergency Department  [address] [facility] [id number] [phone]  Follow up  As needed, If symptoms worsen          Medications Prescribed:  Current Discharge Medication List

## 2024-07-13 NOTE — ED QUICK NOTES
Rounding Completed    Plan of Care reviewed. Waiting for re assessment by ED MD and dispo.  Elimination needs assessed.  .    Bed is locked and in lowest position. Call light within reach.

## 2024-07-13 NOTE — ED INITIAL ASSESSMENT (HPI)
Pt presents with c/o heart palpitations and shortness of breath for the past week but worsening tonight.  She said it was waking her up out of her sleep.

## 2024-09-14 ENCOUNTER — HOSPITAL ENCOUNTER (OUTPATIENT)
Age: 39
Discharge: HOME | End: 2024-09-14
Attending: EMERGENCY MEDICINE
Payer: COMMERCIAL

## 2024-09-14 VITALS
DIASTOLIC BLOOD PRESSURE: 70 MMHG | OXYGEN SATURATION: 98 % | HEART RATE: 89 BPM | SYSTOLIC BLOOD PRESSURE: 138 MMHG | TEMPERATURE: 98 F | RESPIRATION RATE: 20 BRPM

## 2024-09-14 DIAGNOSIS — J06.9 VIRAL URI: Primary | ICD-10-CM

## 2024-09-14 LAB
S PYO AG THROAT QL IA.RAPID: NEGATIVE
SARS-COV-2 RNA RESP QL NAA+PROBE: NOT DETECTED

## 2024-09-14 PROCEDURE — 87651 STREP A DNA AMP PROBE: CPT | Performed by: EMERGENCY MEDICINE

## 2024-09-14 PROCEDURE — 99212 OFFICE O/P EST SF 10 MIN: CPT

## 2024-09-14 PROCEDURE — 99213 OFFICE O/P EST LOW 20 MIN: CPT

## 2024-09-14 NOTE — ED PROVIDER NOTES
Patient Seen in: [facility]      History     Chief Complaint   Patient presents with    Cough/URI     Stated Complaint: Sore throat, congestion, ear ache    Subjective:   HPI    Patient is a 39-year-old female, COVID vaccinated, no booster, who presents now with nasal congestion, sore throat, right ear pain.  The patient states the symptoms started on Thursday.  The patient denies any fever.  The patient has had a minimal cough.  Patient states she initially began experiencing sore throat and nasal congestion Thursday.  Since yesterday, the patient has developed worsening right ear pain.    Objective:   Past Medical History:    Borderline anemia    Per pt     History of chicken pox    in childhood      labor (HCC)    Tattoos    Uterine fibroid              No pertinent past surgical history.              No pertinent social history.            Review of Systems    Positive for stated Chief Complaint: Cough/URI    Other systems are as noted in HPI.  Constitutional and vital signs reviewed.      All other systems reviewed and negative except as noted above.    Physical Exam     ED Triage Vitals [24 1137]   /70   Pulse 89   Resp 20   Temp 98 °F (36.7 °C)   Temp src Oral   SpO2 98 %   O2 Device None (Room air)       Current Vitals:   Vital Signs  BP: 138/70  Pulse: 89  Resp: 20  Temp: 98 °F (36.7 °C)  Temp src: Oral    Oxygen Therapy  SpO2: 98 %  O2 Device: None (Room air)            Physical Exam    Constitutional: Well-developed well-nourished in no acute distress  Head: Normocephalic, no swelling or tenderness  Eyes: Nonicteric sclera, no conjunctival injection  ENT: Left TM is clear and flat.  The right TM is minimally erythematous without any bulging or loss of landmarks.  There is no posterior pharyngeal erythema  Chest: Clear to auscultation, no tenderness  Cardiovascular: Regular rate and rhythm without murmur  Abdomen: Soft, nontender and nondistended  Neurologic: Patient is  awake, alert and oriented ×3.  The patient's motor strength is 5 out of 5 and symmetric in the upper and lower extremities bilaterally  Extremities: No focal swelling or tenderness  Skin: No pallor, no redness or warmth to the touch      ED Course     Labs Reviewed   RAPID SARS-COV-2 BY PCR - Normal   RAPID STREP A - Normal             Pulse ox is 98% on room air, normal.  Vital signs are stable     Patient's negative strep, negative COVID were discussed with her.  Recommend over-the-counter Flonase, Mucinex, ibuprofen.    MDM      Viral URI versus COVID versus otitis media                                   Medical Decision Making      Disposition and Plan     Clinical Impression:  1. Viral URI         Disposition:  Discharge  9/14/2024 12:02 pm    Follow-up:  Mariangel Montez MD  [address] [place] [id number] [phone]      As needed          Medications Prescribed:  Current Discharge Medication List

## 2024-09-14 NOTE — DISCHARGE INSTRUCTIONS
Clearance for coumadin to be sent to adult family [facility] dr Cleopatra Rodriguez surg 9/1 [facility] Flonase for nasal congestion, Mucinex for decongestion.  Ibuprofen for pain.

## 2024-10-27 ENCOUNTER — HOSPITAL ENCOUNTER (OUTPATIENT)
Age: 39
Discharge: HOME | End: 2024-10-27
Payer: COMMERCIAL

## 2024-10-27 ENCOUNTER — APPOINTMENT (OUTPATIENT)
Dept: GENERAL RADIOLOGY | Age: 39
End: 2024-10-27
Attending: Physician Assistant
Payer: COMMERCIAL

## 2024-10-27 VITALS
TEMPERATURE: 98 F | DIASTOLIC BLOOD PRESSURE: 62 MMHG | OXYGEN SATURATION: 100 % | SYSTOLIC BLOOD PRESSURE: 134 MMHG | HEART RATE: 87 BPM | RESPIRATION RATE: 18 BRPM

## 2024-10-27 DIAGNOSIS — L03.012 PARONYCHIA OF FINGER OF LEFT HAND: Primary | ICD-10-CM

## 2024-10-27 LAB — B-HCG UR QL: NEGATIVE

## 2024-10-27 PROCEDURE — 99213 OFFICE O/P EST LOW 20 MIN: CPT

## 2024-10-27 PROCEDURE — 99214 OFFICE O/P EST MOD 30 MIN: CPT

## 2024-10-27 PROCEDURE — 73140 X-RAY EXAM OF FINGER(S): CPT | Performed by: PHYSICIAN ASSISTANT

## 2024-10-27 PROCEDURE — 81025 URINE PREGNANCY TEST: CPT

## 2024-10-27 RX ORDER — MUPIROCIN 20 MG/G
1 OINTMENT TOPICAL 2 TIMES DAILY
Qty: 1 G | Refills: 0 | Status: SHIPPED | OUTPATIENT
Start: 2024-10-27 | End: 2024-11-03

## 2024-10-27 NOTE — DISCHARGE INSTRUCTIONS
Complete entire course of oral and topical antibiotic as directed     Soak finger 3-4 times daily in warm water   Keep finger clean and dry   Keep finger covered when outside the home   Follow up with your primary care provider     If you experience severe/worsening pain or swelling, pus/drainage, redness, warmth, difficulty moving or bending finger, fever, or any other concerning symptom, go to nearest ER immediately

## 2024-10-27 NOTE — ED PROVIDER NOTES
Chief Complaint   Patient presents with    Arm or Hand Injury       History obtained from: patient   services not used     HPI:     Meli Quinonez is a 39 year old female who presents with finger pain x 2-2.5 weeks. Patient states she accidentally hit her finger with a plastic bottle 2.5 weeks ago. Patient endorses increased pain and swelling around nail fold this week. Patient does admit to biting her nails. Denies numbness, weakness, wound, drainage or bleeding, change in skin color/temperature.     PMH  Past Medical History:    Borderline anemia    Per pt     History of chicken pox    in childhood      labor (HCC)    Tattoos    Uterine fibroid       PFSH    PFSH asessment screens reviewed and agree.  Nurses notes reviewed I agree with documentation.    Family History   Problem Relation Age of Onset    Cancer Maternal Grandmother         lung     Breast Cancer Maternal Grandmother 65    Diabetes Maternal Grandfather     Cancer Paternal Grandmother         bladder     Diabetes Paternal Grandmother      Family history reviewed with patient/caregiver and is not pertinent to presenting problem.  Social History     Socioeconomic History    Marital status:      Spouse name: Not on file    Number of children: Not on file    Years of education: Not on file    Highest education level: Not on file   Occupational History    Not on file   Tobacco Use    Smoking status: Never    Smokeless tobacco: Never   Vaping Use    Vaping status: Never Used   Substance and Sexual Activity    Alcohol use: Yes     Comment: socially    Drug use: No    Sexual activity: Not on file   Other Topics Concern    Not on file   Social History Narrative    Not on file     Social Drivers of Health     Financial Resource Strain: Not on file   Food Insecurity: Not on file   Transportation Needs: Not on file   Physical Activity: Not on file   Stress: Not on file   Social Connections: Not on file   Housing Stability: Not on file          ROS:   Positive for stated complaint: left index finger pain   All other systems reviewed and negative except as noted above.  Constitutional and Vital Signs Reviewed.    Physical Exam:     Findings:    /62   Pulse 87   Temp 97.6 °F (36.4 °C) (Temporal)   Resp 18   LMP 07/12/2024 (Exact Date)   SpO2 100%   GENERAL: well developed, no acute distress, non-toxic appearing   SKIN: good skin turgor, no obvious rashes  HEAD: normocephalic, atraumatic  EYES: sclera non-icteric bilaterally, conjunctiva clear bilaterally  OROPHARYNX: MMM, maintaining airway and secretions  NECK: no nuchal rigidity, no trismus, no edema, phonation normal    CARDIO: regular rate, radial pulse 2+ bilaterally, cap refill < 2 sec   LUNGS: no increased WOB  EXTREMITIES: tenderness and edema to distal left 2nd finger and lateral nailfold, no deformity, no fluctuance or drainage, no palmar tenderness, FROM, compartments soft, CMS intact, nail intact   NEURO: no focal deficits  PSYCH: alert and oriented x3, answering questions appropriately, mood appropriate    MDM/Assessment/Plan:   Orders for this encounter:    Orders Placed This Encounter    XR FINGER(S) (MIN 2 VIEWS), LEFT 2ND (CPT=73140)     Order Specific Question:   What is the Relevant Clinical Indication / Reason for Exam?     Answer:   Finger problem     Order Specific Question:   Release to patient     Answer:   Immediate    POCT Pregnancy, Urine    POCT Pregnancy, Urine    amoxicillin clavulanate 875-125 MG Oral Tab     Sig: Take 1 tablet by mouth 2 (two) times daily for 7 days.     Dispense:  14 tablet     Refill:  0    mupirocin 2 % External Ointment     Sig: Apply 1 Application topically 2 (two) times daily for 7 days.     Dispense:  1 g     Refill:  0       Labs performed this visit:  Recent Results (from the past 10 hours)   POCT Pregnancy, Urine    Collection Time: 10/27/24  1:40 PM   Result Value Ref Range    POCT Urine Pregnancy Negative Negative       Imaging  performed this visit:  XR FINGER(S) (MIN 2 VIEWS), LEFT 2ND (CPT=73140)   Final Result   PROCEDURE: XR FINGER(S) (MIN 2 VIEWS), LEFT 2ND (CPT=73140)       COMPARISON: None available.       INDICATIONS: Left hand, distal 2nd digit pain following acute traumatic    injury sustained 2-3 weeks previously.       TECHNIQUE: 3 views were obtained.         FINDINGS:    BONES: No acute fracture or dislocation is evident. No suspicious osseous    lesions are seen. The joint spaces are preserved without evidence of    significant arthropathy.    SOFT TISSUES: Circumferential soft tissue swelling is apparent. Negative    for discernible radiopaque foreign body.   EFFUSION: None visible.                         =====   CONCLUSION:        Soft tissue swelling of the left 2nd digit (index/pointing finger) without    radiographic evidence of underlying osseous injury.           Dictated by (CST): Luis F Vitale MD on 10/27/2024 at 1:31 PM        Finalized by (CST): Luis F Vitale MD on 10/27/2024 at 1:32 PM                   Medical Decision Making  DDx includes paronychia versus cellulitis versus sprain versus contusion versus fracture versus other.  Patient is overall well-appearing with stable vitals.  No signs or symptoms of systemic illness.  No signs of neurovascular compromise or compartment syndrome.  No tenderness or swelling to pad of digit to suggest felon.     X-ray of left index finger reviewed, no evidence of acute osseous abnormality, soft tissue swelling noted to digit.  Discussed results with patient.  There is no fluctuance amenable to I&D therefore will cover with oral and topical antibiotics.  Rx Augmentin, mupirocin ointment.  Discussed supportive care including rest, warm soaks, and OTC Tylenol/Motrin as needed for pain.  Instructed patient to go directly to nearest ER with any worsening or concerning symptoms.  Follow-up with PCP and/or hand specialist.    Amount and/or Complexity of Data Reviewed  Labs:  ordered.  Radiology: ordered and independent interpretation performed.    Risk  OTC drugs.  Prescription drug management.          Diagnosis:    ICD-10-CM    1. Paronychia of finger of left hand  L03.012           All results reviewed and discussed with patient/patient's family. Patient/patient's family verbalize excellent understanding of instructions and feels comfortable with plan. All of patient's/patient's family's questions were addressed.   See AVS for detailed discharge instructions for your condition today.    Follow Up with:  Mariangel Montez MD  [address] [address]  [phone]          Ricky Crooks MD  [address] [id number]  [phone]      Hand specialist      Note: This document was dictated using Dragon medical dictation software.  Proofreading was performed to the best of my ability, but errors may be present.    Lauren Deshpande PA-C

## 2024-10-27 NOTE — ED INITIAL ASSESSMENT (HPI)
States she hit left index finger with a plastic bottle 2 /12 weeks ago, here for pain and swelling to tip of finger, cms intact

## 2024-12-30 ENCOUNTER — TELEPHONE (OUTPATIENT)
Dept: INTERNAL MEDICINE CLINIC | Facility: CLINIC | Age: 39
End: 2024-12-30

## 2024-12-30 DIAGNOSIS — Z12.31 SCREENING MAMMOGRAM FOR BREAST CANCER: Primary | ICD-10-CM

## 2025-02-12 ENCOUNTER — HOSPITAL ENCOUNTER (OUTPATIENT)
Dept: MAMMOGRAPHY | Age: 40
Discharge: HOME | End: 2025-02-12
Payer: COMMERCIAL

## 2025-02-12 DIAGNOSIS — Z12.31 SCREENING MAMMOGRAM FOR BREAST CANCER: ICD-10-CM

## 2025-02-12 PROCEDURE — 77067 SCR MAMMO BI INCL CAD: CPT

## 2025-02-12 PROCEDURE — 77063 BREAST TOMOSYNTHESIS BI: CPT

## 2025-02-18 ENCOUNTER — HOSPITAL ENCOUNTER (OUTPATIENT)
Dept: MAMMOGRAPHY | Facility: HOSPITAL | Age: 40
Discharge: HOME | End: 2025-02-18
Payer: COMMERCIAL

## 2025-02-18 ENCOUNTER — HOSPITAL ENCOUNTER (OUTPATIENT)
Dept: ULTRASOUND IMAGING | Facility: HOSPITAL | Age: 40
Discharge: HOME | End: 2025-02-18
Payer: COMMERCIAL

## 2025-02-18 DIAGNOSIS — R92.8 ABNORMAL MAMMOGRAM: ICD-10-CM

## 2025-02-18 PROCEDURE — 76642 ULTRASOUND BREAST LIMITED: CPT

## 2025-02-18 PROCEDURE — 77065 DX MAMMO INCL CAD UNI: CPT

## 2025-02-18 PROCEDURE — 77061 BREAST TOMOSYNTHESIS UNI: CPT

## 2025-02-19 DIAGNOSIS — R92.8 ABNORMAL MAMMOGRAM OF RIGHT BREAST: Primary | ICD-10-CM

## 2025-06-09 ENCOUNTER — NURSE TRIAGE (OUTPATIENT)
Dept: INTERNAL MEDICINE CLINIC | Facility: CLINIC | Age: 40
End: 2025-06-09

## 2025-06-09 NOTE — TELEPHONE ENCOUNTER
Action Requested: Summary for Provider     []  Critical Lab, Recommendations Needed  [] Need Additional Advice  [x]   FYI    []   Need Orders  [] Need Medications Sent to Pharmacy  []  Other     SUMMARY:   Spoke with patient, Date of Birth verified  She stated she noticed a few times after wiping herself after a bowel movements specks of blood, few times happened within the last month or two.  This weekend when she wiped excessively she had streak of pinkish blood in the tissue, rectal itching.   She denies history of hemorrhoid.   Pt denies chest pain, shortness of breath, constiptaion, diarrhea, abd pain, no other sx.   Pt was advised to continue monitor her sx, if sx persist or gets worse to go to ER/ IC, she agreed and stated understanding.   Appt made with Lucy TURNER for eval & treat.        Reason for call: rectal issue  Onset: a month         Reason for Disposition   MILD rectal bleeding (more than just a few drops or streaks)    Protocols used: Rectal Bleeding-A-OH    Future Appointments   Date Time Provider Department Center   6/10/2025 11:00 AM Lucy Escalante APRN [facility]   7/15/2025  9:00 AM Orion Khan MD [id number]   10/6/2025 12:20 PM Mariangel Montez MD [facility]

## 2025-06-10 ENCOUNTER — OFFICE VISIT (OUTPATIENT)
Dept: INTERNAL MEDICINE CLINIC | Facility: CLINIC | Age: 40
End: 2025-06-10

## 2025-06-10 VITALS
WEIGHT: 293 LBS | HEART RATE: 77 BPM | BODY MASS INDEX: 47.09 KG/M2 | TEMPERATURE: 98 F | DIASTOLIC BLOOD PRESSURE: 81 MMHG | OXYGEN SATURATION: 100 % | SYSTOLIC BLOOD PRESSURE: 139 MMHG | HEIGHT: 66 IN

## 2025-06-10 DIAGNOSIS — K60.2 ANAL FISSURE: Primary | ICD-10-CM

## 2025-06-10 PROCEDURE — 99213 OFFICE O/P EST LOW 20 MIN: CPT | Performed by: NURSE PRACTITIONER

## 2025-06-10 PROCEDURE — 3075F SYST BP GE 130 - 139MM HG: CPT | Performed by: NURSE PRACTITIONER

## 2025-06-10 PROCEDURE — 3008F BODY MASS INDEX DOCD: CPT | Performed by: NURSE PRACTITIONER

## 2025-06-10 PROCEDURE — 3079F DIAST BP 80-89 MM HG: CPT | Performed by: NURSE PRACTITIONER

## 2025-06-10 NOTE — PROGRESS NOTES
Subjective:   Meli Quinonez is a 39 year old female who presents for Hemorrhoids (Possible hemorrhoid blood when wiping after bowel movement, itchiness )     History of Present Illness  Meli Quinonez is a 39 year old female who presents with blood on toilet paper after wiping and irritation after bowel movements.    She has been experiencing rectal bleeding characterized by small specks of blood when wiping after bowel movements, first noticed last month while in [place]. This past Saturday, she observed a slight increase in the amount of blood, described as a 'little bit more pink streak' on the tissue. The blood is only present when wiping and not in the stool itself.    She describes itchiness and irritation around the rectal area, particularly when it is warm, and feels a persistent sensation of moisture and lack of cleanliness despite excessive wiping. She has never been diagnosed with hemorrhoids and does not recall having them after childbirth. She reports regular bowel movements with no abdominal pain, constipation, or changes in stool consistency.    Her family history includes a grandfather who had colon cancer, although he did not die from it. She has not undergone a colonoscopy yet, as she is not of the typical age for screening.    She is currently taking a multivitamin and has not been using phentermine or allergy medications recently. She has not tried any home remedies for her symptoms.    No abdominal pain, lower abdominal pain, constipation, hard stools, unexplained weight loss, or decreased appetite.    History/Other:    Chief Complaint Reviewed and Verified  Nursing Notes Reviewed and   Verified  Tobacco Reviewed  Allergies Reviewed  Medications Reviewed    Problem List Reviewed  Medical History Reviewed  Surgical History   Reviewed  Family History Reviewed  Social History Reviewed         Tobacco:  She has never smoked tobacco.    Current Medications[1]      Review of  Systems:  Review of Systems  10 point review of systems otherwise negative with the exception of HPI and assessment and plan.    Objective:   /81 (BP Location: Right arm, Patient Position: Sitting, Cuff Size: large)   Pulse 77   Temp 98.4 °F (36.9 °C) (Oral)   Ht 5' 6\" (1.676 m)   Wt (!) 360 lb 6.4 oz (163.5 kg)   LMP 05/22/2025 (Exact Date)   SpO2 100%   BMI 58.17 kg/m²  Estimated body mass index is 58.17 kg/m² as calculated from the following:    Height as of this encounter: 5' 6\" (1.676 m).    Weight as of this encounter: 360 lb 6.4 oz (163.5 kg).        Physical Exam  Vitals reviewed.   Constitutional:       General: She is not in acute distress.     Appearance: She is not ill-appearing or toxic-appearing.   Cardiovascular:      Rate and Rhythm: Normal rate.   Pulmonary:      Effort: Pulmonary effort is normal. No respiratory distress.   Abdominal:      Comments: Rectal exam reveals 3mm fissure to left side of anus without active bleeding. No external hemorrhoids.   Skin:     General: Skin is warm and dry.   Neurological:      Mental Status: She is alert.         Assessment & Plan:   1. Anal fissure  - Gastro Referral - In Network    Assessment & Plan  Anal fissure  Small external anal fissure likely from excessive wiping/friction. No s/s infection or significant bleeding. Possible internal hemorrhoids considered.  - Recommend flushable wipes or bidet to reduce irritation.  - Apply OTC aquaphor or bacitracin ointment to affected area 1-2 times daily to keep the area moisturized.  - Discussed red flags including increased bleeding with dizziness/lightheadedness    Possible internal hemorrhoids  Internal hemorrhoids possible but unconfirmed. Symptoms may be due to excessive wiping.  - Consider gastroenterology referral for anoscopy if symptoms persist.          Return if symptoms worsen or fail to improve.    Lucy Escalante, YUE, 6/10/2025, 10:57 AM        [1]   Current Outpatient Medications    Medication Sig Dispense Refill    Phentermine HCl 15 MG Oral Cap Take 1 capsule (15 mg total) by mouth every morning. 30 capsule 2    topiramate 25 MG Oral Tab Take 1 tablet (25 mg total) by mouth every evening. (Patient not taking: Reported on 7/13/2024) 30 tablet 5    fluticasone propionate 50 MCG/ACT Nasal Suspension 2 sprays by Nasal route in the morning and 2 sprays before bedtime. 16 g 3    montelukast 10 MG Oral Tab Take 1 tablet (10 mg total) by mouth nightly. (Patient not taking: Reported on 7/13/2024) 30 tablet 3    loratadine-pseudoephedrine ER  MG Oral Tablet 24 Hr Take 1 tablet by mouth at bedtime. (Patient not taking: Reported on 7/13/2024) 30 tablet 1    AZELASTINE 0.1 % Nasal Solution USE 2 SPRAYS IN EACH NOSTRIL TWICE DAILY 90 mL 0    Multiple Vitamin (MULTI-VITAMIN DAILY) Oral Tab Take by mouth.

## 2025-06-10 NOTE — PATIENT INSTRUCTIONS
Recommend using a small amount of aquaphor or bacitracin ointment to the affected area once or twice daily to keep it moisturized    Consider using hygiene wipes (unscented, sensitive) when you have a bowel movement for gentler cleansing

## 2025-06-10 NOTE — PROGRESS NOTES
The following individual(s) verbally consented to be recorded using ambient AI listening technology and understand that they can each withdraw their consent to this listening technology at any point by asking the clinician to turn off or pause the recording:    Patient name: Meli Quinonez  YES

## (undated) NOTE — LETTER
11/15/21        [address] [facility] [address]      Dear Salina Hartman records indicate that you have outstanding lab work and or testing that was ordered for you and has not yet been completed:  Orders Placed This Encounter

## (undated) NOTE — ED AVS SNAPSHOT
Ketan Alcala   MRN: [id number]    Department:  [facility] Emergency Department   Date of Visit:  2/6/2020           Disclosure     Insurance plans vary and the physician(s) referred by the ER may not be covered by your plan.  Please contact y CARE PHYSICIAN AT ONCE OR RETURN IMMEDIATELY TO THE EMERGENCY DEPARTMENT. If you have been prescribed any medication(s), please fill your prescription right away and begin taking the medication(s) as directed.   If you believe that any of the medications

## (undated) NOTE — LETTER
Date & Time: 9/14/2024, 12:02 PM  Patient: Meli Quinonez  Encounter Provider(s):    Lm Babin MD       To Whom It May Concern:    Meli Quinonez was seen and treated in our department on 9/14/2024. She should not return to work until 9/16/2024 .    If you have any questions or concerns, please do not hesitate to call.        _____________________________  Physician/[facility] Signature

## (undated) NOTE — LETTER
Date & Time: 7/13/2024, 5:00 AM  Patient: Meli Quinonez  Encounter Provider(s):    Roland Block MD       To Whom It May Concern:    Meli Quinonez was seen and treated in our department on 7/13/2024. She should not return to work until 7/15/24 .    If you have any questions or concerns, please do not hesitate to call.        _____________________________  Physician/[facility] Signature